# Patient Record
Sex: MALE | Race: WHITE | Employment: FULL TIME | URBAN - NONMETROPOLITAN AREA
[De-identification: names, ages, dates, MRNs, and addresses within clinical notes are randomized per-mention and may not be internally consistent; named-entity substitution may affect disease eponyms.]

---

## 2018-12-18 ENCOUNTER — HOSPITAL ENCOUNTER (EMERGENCY)
Age: 38
Discharge: HOME | End: 2018-12-18
Attending: FAMILY MEDICINE

## 2018-12-18 VITALS
WEIGHT: 250 LBS | RESPIRATION RATE: 17 BRPM | DIASTOLIC BLOOD PRESSURE: 77 MMHG | HEIGHT: 70 IN | OXYGEN SATURATION: 100 % | HEART RATE: 85 BPM | SYSTOLIC BLOOD PRESSURE: 118 MMHG | BODY MASS INDEX: 35.79 KG/M2 | TEMPERATURE: 98 F

## 2018-12-18 DIAGNOSIS — R55 SYNCOPE, UNSPECIFIED SYNCOPE TYPE: Primary | ICD-10-CM

## 2018-12-18 DIAGNOSIS — F19.10 SUBSTANCE ABUSE (HCC): ICD-10-CM

## 2018-12-18 LAB
ABSOLUTE EOS #: 0.1 K/UL (ref 0–0.4)
ABSOLUTE IMMATURE GRANULOCYTE: NORMAL K/UL (ref 0–0.3)
ABSOLUTE LYMPH #: 2.6 K/UL (ref 1–4.8)
ABSOLUTE MONO #: 0.7 K/UL (ref 0–1)
ACETAMINOPHEN LEVEL: <5 UG/ML (ref 10–30)
ALBUMIN SERPL-MCNC: 4.8 G/DL (ref 3.5–5.2)
ALBUMIN/GLOBULIN RATIO: ABNORMAL (ref 1–2.5)
ALP BLD-CCNC: 81 U/L (ref 40–129)
ALT SERPL-CCNC: 21 U/L (ref 5–41)
AMPHETAMINE SCREEN URINE: POSITIVE
ANION GAP SERPL CALCULATED.3IONS-SCNC: 13 MMOL/L (ref 9–17)
AST SERPL-CCNC: 12 U/L
BARBITURATE SCREEN URINE: NEGATIVE
BASOPHILS # BLD: 0 % (ref 0–2)
BASOPHILS ABSOLUTE: 0 K/UL (ref 0–0.2)
BENZODIAZEPINE SCREEN, URINE: POSITIVE
BILIRUB SERPL-MCNC: 0.34 MG/DL (ref 0.3–1.2)
BILIRUBIN URINE: NEGATIVE
BUN BLDV-MCNC: 11 MG/DL (ref 6–20)
BUN/CREAT BLD: 10 (ref 9–20)
BUPRENORPHINE URINE: ABNORMAL
CALCIUM SERPL-MCNC: 9.5 MG/DL (ref 8.6–10.4)
CANNABINOID SCREEN URINE: NEGATIVE
CHLORIDE BLD-SCNC: 102 MMOL/L (ref 98–107)
CO2: 27 MMOL/L (ref 20–31)
COCAINE METABOLITE, URINE: POSITIVE
COLOR: YELLOW
COMMENT UA: NORMAL
CREAT SERPL-MCNC: 1.12 MG/DL (ref 0.7–1.2)
DIFFERENTIAL TYPE: YES
EKG ATRIAL RATE: 85 BPM
EKG P AXIS: 26 DEGREES
EKG P-R INTERVAL: 154 MS
EKG Q-T INTERVAL: 398 MS
EKG QRS DURATION: 78 MS
EKG QTC CALCULATION (BAZETT): 473 MS
EKG R AXIS: 72 DEGREES
EKG T AXIS: 34 DEGREES
EKG VENTRICULAR RATE: 85 BPM
EOSINOPHILS RELATIVE PERCENT: 2 % (ref 0–5)
GFR AFRICAN AMERICAN: >60 ML/MIN
GFR NON-AFRICAN AMERICAN: >60 ML/MIN
GFR SERPL CREATININE-BSD FRML MDRD: ABNORMAL ML/MIN/{1.73_M2}
GFR SERPL CREATININE-BSD FRML MDRD: ABNORMAL ML/MIN/{1.73_M2}
GLUCOSE BLD-MCNC: 95 MG/DL (ref 70–99)
GLUCOSE URINE: NEGATIVE
HCT VFR BLD CALC: 48.4 % (ref 41–53)
HEMOGLOBIN: 16.2 G/DL (ref 13.5–17.5)
IMMATURE GRANULOCYTES: NORMAL %
KETONES, URINE: NEGATIVE
LEUKOCYTE ESTERASE, URINE: NEGATIVE
LIPASE: 25 U/L (ref 13–60)
LYMPHOCYTES # BLD: 35 % (ref 13–44)
MAGNESIUM: 3 MG/DL (ref 1.6–2.6)
MCH RBC QN AUTO: 29.3 PG (ref 26–34)
MCHC RBC AUTO-ENTMCNC: 33.4 G/DL (ref 31–37)
MCV RBC AUTO: 87.7 FL (ref 80–100)
MDMA URINE: ABNORMAL
METHADONE SCREEN, URINE: NEGATIVE
METHAMPHETAMINE, URINE: POSITIVE
MONOCYTES # BLD: 9 % (ref 5–9)
NITRITE, URINE: NEGATIVE
NRBC AUTOMATED: NORMAL PER 100 WBC
OPIATES, URINE: NEGATIVE
OXYCODONE SCREEN URINE: NEGATIVE
PDW BLD-RTO: 12.5 % (ref 12.1–15.2)
PH UA: 6 (ref 5–8)
PHENCYCLIDINE, URINE: NEGATIVE
PLATELET # BLD: 361 K/UL (ref 140–450)
PLATELET ESTIMATE: NORMAL
PMV BLD AUTO: NORMAL FL (ref 6–12)
POTASSIUM SERPL-SCNC: 3.5 MMOL/L (ref 3.7–5.3)
PROPOXYPHENE, URINE: NEGATIVE
PROTEIN UA: NEGATIVE
RBC # BLD: 5.52 M/UL (ref 4.5–5.9)
RBC # BLD: NORMAL 10*6/UL
SEG NEUTROPHILS: 54 % (ref 39–75)
SEGMENTED NEUTROPHILS ABSOLUTE COUNT: 4.1 K/UL (ref 2.1–6.5)
SODIUM BLD-SCNC: 142 MMOL/L (ref 135–144)
SPECIFIC GRAVITY UA: 1.01 (ref 1–1.03)
TEST INFORMATION: ABNORMAL
TOTAL PROTEIN: 7.7 G/DL (ref 6.4–8.3)
TRICYCLIC ANTIDEPRESSANTS, UR: NEGATIVE
TROPONIN INTERP: NORMAL
TROPONIN T: <0.03 NG/ML
TROPONIN, HIGH SENSITIVITY: NORMAL NG/L (ref 0–22)
TURBIDITY: CLEAR
URINE HGB: NEGATIVE
UROBILINOGEN, URINE: NORMAL
WBC # BLD: 7.6 K/UL (ref 3.5–11)
WBC # BLD: NORMAL 10*3/UL

## 2018-12-18 PROCEDURE — 80053 COMPREHEN METABOLIC PANEL: CPT

## 2018-12-18 PROCEDURE — 83735 ASSAY OF MAGNESIUM: CPT

## 2018-12-18 PROCEDURE — 84484 ASSAY OF TROPONIN QUANT: CPT

## 2018-12-18 PROCEDURE — 80306 DRUG TEST PRSMV INSTRMNT: CPT

## 2018-12-18 PROCEDURE — 83690 ASSAY OF LIPASE: CPT

## 2018-12-18 PROCEDURE — 81003 URINALYSIS AUTO W/O SCOPE: CPT

## 2018-12-18 PROCEDURE — 93005 ELECTROCARDIOGRAM TRACING: CPT

## 2018-12-18 PROCEDURE — 6370000000 HC RX 637 (ALT 250 FOR IP): Performed by: FAMILY MEDICINE

## 2018-12-18 PROCEDURE — 99284 EMERGENCY DEPT VISIT MOD MDM: CPT

## 2018-12-18 PROCEDURE — 80307 DRUG TEST PRSMV CHEM ANLYZR: CPT

## 2018-12-18 PROCEDURE — 85025 COMPLETE CBC W/AUTO DIFF WBC: CPT

## 2018-12-18 RX ORDER — ONDANSETRON 2 MG/ML
4 INJECTION INTRAMUSCULAR; INTRAVENOUS EVERY 30 MIN PRN
Status: DISCONTINUED | OUTPATIENT
Start: 2018-12-18 | End: 2018-12-18 | Stop reason: HOSPADM

## 2018-12-18 RX ORDER — CLONIDINE HYDROCHLORIDE 0.1 MG/1
0.1 TABLET ORAL PRN
COMMUNITY

## 2018-12-18 RX ORDER — ALPRAZOLAM 1 MG/1
1 TABLET ORAL NIGHTLY PRN
COMMUNITY

## 2018-12-18 RX ORDER — IBUPROFEN 200 MG
200 TABLET ORAL EVERY 6 HOURS PRN
COMMUNITY

## 2018-12-18 RX ORDER — BUPROPION HYDROCHLORIDE 150 MG/1
150 TABLET ORAL EVERY MORNING
COMMUNITY

## 2018-12-18 RX ORDER — TRAZODONE HYDROCHLORIDE 100 MG/1
100 TABLET ORAL NIGHTLY PRN
COMMUNITY

## 2018-12-18 RX ORDER — GABAPENTIN 300 MG/1
300 CAPSULE ORAL PRN
COMMUNITY

## 2018-12-18 RX ORDER — CLONIDINE HYDROCHLORIDE 0.1 MG/1
0.1 TABLET ORAL ONCE
Status: COMPLETED | OUTPATIENT
Start: 2018-12-18 | End: 2018-12-18

## 2018-12-18 RX ADMIN — CLONIDINE HYDROCHLORIDE 0.1 MG: 0.1 TABLET ORAL at 19:11

## 2018-12-18 ASSESSMENT — PATIENT HEALTH QUESTIONNAIRE - PHQ9: SUM OF ALL RESPONSES TO PHQ QUESTIONS 1-9: 21

## 2018-12-19 NOTE — PROGRESS NOTES
2029  Call from [facility] of Jazzy Browning stating pt is not suicidal, had thoughts in the past, would like Clinician to schedule pt an appointment with [facility] in [place]. 2036  Call to [facility] at [phone], consulted with Freya Salomon who scheduled pt an appointment tomorrow at 10:30am, [facility]/[place]. Seth request Clinician fax pts facesheet to [phone].    2038  Call to [facility], consulted with Anya Medel RN, informed of scheduled appointment. 2041  Facesheet faxed to Freya Salomon.

## 2018-12-19 NOTE — ED NOTES
Pt is currently in between counselors. Pt has seen [facility] in [place] and [facility] in [place]. Pt states, \"I miss [facility]. \"  Pt does not currently have a counselor. Pt is willing to speak with someone and travel to [facility] for counseling appointment. Pt is alert and oriented and does not have any current thoughts to harm himself. Dr. Phyllis Garg notified.       Min Schneider, RN  12/18/18 Christine Leigh 57 Charla Peck RN  12/18/18 [id number]

## 2023-02-04 PROBLEM — K21.9 GASTROESOPHAGEAL REFLUX DISEASE WITHOUT ESOPHAGITIS: Status: RESOLVED | Noted: 2023-02-04 | Resolved: 2023-02-04

## 2023-02-04 PROBLEM — R53.83 FATIGUE: Status: ACTIVE | Noted: 2023-02-04

## 2023-02-04 PROBLEM — R00.0 TACHYCARDIA: Status: ACTIVE | Noted: 2023-02-04

## 2023-02-04 PROBLEM — F41.1 GENERALIZED ANXIETY DISORDER WITH PANIC ATTACKS: Status: ACTIVE | Noted: 2023-02-04

## 2023-02-04 PROBLEM — R59.0 MEDIASTINAL LYMPHADENOPATHY: Status: ACTIVE | Noted: 2023-02-04

## 2023-02-04 PROBLEM — I10 HTN (HYPERTENSION): Status: ACTIVE | Noted: 2023-02-04

## 2023-02-04 PROBLEM — E66.01 MORBID OBESITY WITH BODY MASS INDEX (BMI) OF 40.0 OR HIGHER (MULTI): Status: RESOLVED | Noted: 2023-02-04 | Resolved: 2023-02-04

## 2023-02-04 PROBLEM — R06.02 SHORTNESS OF BREATH ON EXERTION: Status: ACTIVE | Noted: 2023-02-04

## 2023-02-04 PROBLEM — G47.33 OBSTRUCTIVE SLEEP APNEA, ADULT: Status: ACTIVE | Noted: 2023-02-04

## 2023-02-04 PROBLEM — R91.8 ABNORMAL CT SCAN, LUNG: Status: RESOLVED | Noted: 2023-02-04 | Resolved: 2023-02-04

## 2023-02-04 PROBLEM — K21.9 GASTROESOPHAGEAL REFLUX DISEASE WITHOUT ESOPHAGITIS: Status: ACTIVE | Noted: 2023-02-04

## 2023-02-04 PROBLEM — R91.1 LUNG NODULE, SOLITARY: Status: ACTIVE | Noted: 2023-02-04

## 2023-02-04 PROBLEM — K21.9 GERD (GASTROESOPHAGEAL REFLUX DISEASE): Status: ACTIVE | Noted: 2023-02-04

## 2023-02-04 PROBLEM — E66.01 MORBID OBESITY WITH BODY MASS INDEX (BMI) OF 40.0 OR HIGHER (MULTI): Status: ACTIVE | Noted: 2023-02-04

## 2023-02-04 PROBLEM — R53.83 FATIGUE: Status: RESOLVED | Noted: 2023-02-04 | Resolved: 2023-02-04

## 2023-02-04 PROBLEM — F41.0 GENERALIZED ANXIETY DISORDER WITH PANIC ATTACKS: Status: ACTIVE | Noted: 2023-02-04

## 2023-02-04 PROBLEM — L52 EN (ERYTHEMA NODOSUM): Status: ACTIVE | Noted: 2023-02-04

## 2023-02-04 PROBLEM — R91.8 LUNG NODULES: Status: RESOLVED | Noted: 2023-02-04 | Resolved: 2023-02-04

## 2023-02-04 PROBLEM — J44.9 OBSTRUCTIVE LUNG DISEASE (MULTI): Status: ACTIVE | Noted: 2023-02-04

## 2023-02-04 PROBLEM — R59.0 MEDIASTINAL LYMPHADENOPATHY: Status: RESOLVED | Noted: 2023-02-04 | Resolved: 2023-02-04

## 2023-02-04 PROBLEM — R91.8 LUNG NODULES: Status: ACTIVE | Noted: 2023-02-04

## 2023-02-04 PROBLEM — G43.909 MIGRAINES: Status: ACTIVE | Noted: 2023-02-04

## 2023-02-04 PROBLEM — E11.9 TYPE 2 DIABETES MELLITUS WITHOUT COMPLICATION, WITHOUT LONG-TERM CURRENT USE OF INSULIN (MULTI): Status: ACTIVE | Noted: 2023-02-04

## 2023-02-04 PROBLEM — F11.11 HISTORY OF OPIOID ABUSE (MULTI): Status: ACTIVE | Noted: 2023-02-04

## 2023-02-04 PROBLEM — R91.8 ABNORMAL CT SCAN, LUNG: Status: ACTIVE | Noted: 2023-02-04

## 2023-02-04 PROBLEM — R91.1 LUNG NODULE, SOLITARY: Status: RESOLVED | Noted: 2023-02-04 | Resolved: 2023-02-04

## 2023-02-04 RX ORDER — LISINOPRIL 10 MG/1
1 TABLET ORAL DAILY
COMMUNITY
Start: 2021-07-29 | End: 2023-09-18

## 2023-02-04 RX ORDER — ALBUTEROL SULFATE 90 UG/1
2 AEROSOL, METERED RESPIRATORY (INHALATION) 4 TIMES DAILY PRN
COMMUNITY
Start: 2021-12-07 | End: 2023-07-31 | Stop reason: SDUPTHER

## 2023-02-04 RX ORDER — METFORMIN HYDROCHLORIDE 500 MG/1
1 TABLET ORAL 2 TIMES DAILY
COMMUNITY
Start: 2022-10-11 | End: 2023-04-18

## 2023-02-04 RX ORDER — OMEPRAZOLE 40 MG/1
1 CAPSULE, DELAYED RELEASE ORAL DAILY
COMMUNITY
Start: 2022-09-13 | End: 2023-09-18 | Stop reason: SDUPTHER

## 2023-02-04 RX ORDER — ESCITALOPRAM OXALATE 20 MG/1
1 TABLET ORAL DAILY
COMMUNITY
Start: 2021-08-31 | End: 2023-09-18 | Stop reason: SDUPTHER

## 2023-02-04 RX ORDER — LORATADINE 10 MG/1
1 CAPSULE, LIQUID FILLED ORAL DAILY
COMMUNITY

## 2023-02-04 RX ORDER — SEMAGLUTIDE 1.34 MG/ML
1 INJECTION, SOLUTION SUBCUTANEOUS
COMMUNITY
End: 2023-06-22

## 2023-03-23 ENCOUNTER — OFFICE VISIT (OUTPATIENT)
Dept: PRIMARY CARE | Facility: CLINIC | Age: 43
End: 2023-03-23
Payer: COMMERCIAL

## 2023-03-23 VITALS
HEIGHT: 70 IN | HEART RATE: 91 BPM | BODY MASS INDEX: 45.1 KG/M2 | OXYGEN SATURATION: 98 % | SYSTOLIC BLOOD PRESSURE: 126 MMHG | DIASTOLIC BLOOD PRESSURE: 80 MMHG | WEIGHT: 315 LBS

## 2023-03-23 DIAGNOSIS — E11.9 TYPE 2 DIABETES MELLITUS WITHOUT COMPLICATION, WITHOUT LONG-TERM CURRENT USE OF INSULIN (MULTI): Primary | ICD-10-CM

## 2023-03-23 DIAGNOSIS — E66.01 CLASS 3 SEVERE OBESITY DUE TO EXCESS CALORIES WITH SERIOUS COMORBIDITY AND BODY MASS INDEX (BMI) OF 45.0 TO 49.9 IN ADULT (MULTI): ICD-10-CM

## 2023-03-23 DIAGNOSIS — I10 PRIMARY HYPERTENSION: ICD-10-CM

## 2023-03-23 PROBLEM — R00.0 TACHYCARDIA: Status: RESOLVED | Noted: 2023-02-04 | Resolved: 2023-03-23

## 2023-03-23 PROBLEM — E66.813 CLASS 3 SEVERE OBESITY DUE TO EXCESS CALORIES WITH SERIOUS COMORBIDITY AND BODY MASS INDEX (BMI) OF 45.0 TO 49.9 IN ADULT: Status: ACTIVE | Noted: 2023-02-04

## 2023-03-23 PROBLEM — R06.02 SHORTNESS OF BREATH ON EXERTION: Status: RESOLVED | Noted: 2023-02-04 | Resolved: 2023-03-23

## 2023-03-23 PROCEDURE — 3008F BODY MASS INDEX DOCD: CPT | Performed by: INTERNAL MEDICINE

## 2023-03-23 PROCEDURE — 3074F SYST BP LT 130 MM HG: CPT | Performed by: INTERNAL MEDICINE

## 2023-03-23 PROCEDURE — 99213 OFFICE O/P EST LOW 20 MIN: CPT | Performed by: INTERNAL MEDICINE

## 2023-03-23 PROCEDURE — 3044F HG A1C LEVEL LT 7.0%: CPT | Performed by: INTERNAL MEDICINE

## 2023-03-23 PROCEDURE — 3079F DIAST BP 80-89 MM HG: CPT | Performed by: INTERNAL MEDICINE

## 2023-03-23 PROCEDURE — 4010F ACE/ARB THERAPY RXD/TAKEN: CPT | Performed by: INTERNAL MEDICINE

## 2023-03-23 ASSESSMENT — ENCOUNTER SYMPTOMS
FATIGUE: 1
WHEEZING: 0
DIARRHEA: 0
BLOOD IN STOOL: 0
CHEST TIGHTNESS: 0
COUGH: 0
ABDOMINAL PAIN: 0
SHORTNESS OF BREATH: 0
ARTHRALGIAS: 0
NAUSEA: 1
BACK PAIN: 0
PALPITATIONS: 0
VOMITING: 0

## 2023-03-23 NOTE — ASSESSMENT & PLAN NOTE
We added Ozempic for his diabetes and he has responded well in the way of weight loss.  We estimate he is lost approximately 14 pounds since his last weigh-in and he will continue to take the Ozempic as directed which is 1 mg weekly  He will call with any problems and otherwise we want to see him back in 3 months

## 2023-03-23 NOTE — ASSESSMENT & PLAN NOTE
He has been doing well in the way of his diabetes and his last hemoglobin A1c was 6.2 on January 30.  We will check another one just prior to next visit

## 2023-03-23 NOTE — PROGRESS NOTES
Subjective   Patient ID: Sebastian Carr is a 43 y.o. male who presents for No chief complaint on file..  HPI  He is here today for follow-up on his diabetes and also his weight.  He states it took some time to get the Ozempic through his plan and he has been on it for approximately 5 weeks.  He states in the beginning he was having significant nausea and some bloating but he states that over time it is improving and he feels like he is tolerating it relatively well.  We estimate that according to our scales he is lost already approximately 14 pounds.  His blood sugars have also been very good.  We decided to continue with the medication but I would like to see complete resolution of the symptoms and he will call if it is not continuing to improve and certainly if is getting worse.  We did conduct a full review of systems and we discussed future follow-up.  Overall he reports doing well with his breathing and his blood pressure is under great control.  We will see him back in 3 months for follow-up and he knows to get lab work just prior to that visit.  Review of Systems   Constitutional:  Positive for fatigue.   Respiratory:  Negative for cough, chest tightness, shortness of breath and wheezing.    Cardiovascular:  Negative for chest pain, palpitations and leg swelling.   Gastrointestinal:  Positive for nausea. Negative for abdominal pain, blood in stool, diarrhea and vomiting.   Musculoskeletal:  Negative for arthralgias and back pain.     Objective   Physical Exam  Vitals and nursing note reviewed.   Constitutional:       General: He is not in acute distress.     Appearance: Normal appearance.   HENT:      Head: Normocephalic and atraumatic.   Eyes:      Conjunctiva/sclera: Conjunctivae normal.   Cardiovascular:      Rate and Rhythm: Normal rate and regular rhythm.      Heart sounds: Normal heart sounds.   Pulmonary:      Effort: No respiratory distress.      Breath sounds: No wheezing.   Abdominal:      Palpations:  Abdomen is soft.      Tenderness: There is no abdominal tenderness. There is no guarding.   Musculoskeletal:         General: No swelling. Normal range of motion.   Skin:     General: Skin is warm and dry.   Neurological:      General: No focal deficit present.      Mental Status: He is alert and oriented to person, place, and time.   Psychiatric:         Behavior: Behavior normal.       Assessment/Plan   Problem List Items Addressed This Visit          Circulatory    HTN (hypertension)     Blood pressure is currently well controlled  He will continue with lisinopril 10 mg daily         Relevant Orders    Follow Up In Primary Care    Basic Metabolic Panel       Endocrine/Metabolic    Type 2 diabetes mellitus without complication, without long-term current use of insulin (CMS/[facility]) - Primary     He has been doing well in the way of his diabetes and his last hemoglobin A1c was 6.2 on January 30.  We will check another one just prior to next visit         Relevant Orders    Follow Up In Primary Care    Hemoglobin A1c    Class 3 severe obesity due to excess calories with serious comorbidity and body mass index (BMI) of 45.0 to 49.9 in adult (CMS/[facility])     We added Ozempic for his diabetes and he has responded well in the way of weight loss.  We estimate he is lost approximately 14 pounds since his last weigh-in and he will continue to take the Ozempic as directed which is 1 mg weekly  He will call with any problems and otherwise we want to see him back in 3 months         Relevant Orders    Follow Up In Primary Care          Marcelina Chavez DO

## 2023-04-18 DIAGNOSIS — E11.9 TYPE 2 DIABETES MELLITUS WITHOUT COMPLICATION, WITHOUT LONG-TERM CURRENT USE OF INSULIN (MULTI): Primary | ICD-10-CM

## 2023-04-18 RX ORDER — METFORMIN HYDROCHLORIDE 500 MG/1
TABLET ORAL
Qty: 360 TABLET | Refills: 1 | Status: SHIPPED | OUTPATIENT
Start: 2023-04-18 | End: 2023-12-21

## 2023-04-24 ENCOUNTER — TELEPHONE (OUTPATIENT)
Dept: PRIMARY CARE | Facility: CLINIC | Age: 43
End: 2023-04-24

## 2023-04-24 ENCOUNTER — TELEMEDICINE (OUTPATIENT)
Dept: PRIMARY CARE | Facility: CLINIC | Age: 43
End: 2023-04-24
Payer: COMMERCIAL

## 2023-04-24 DIAGNOSIS — U07.1 COVID-19 VIRUS INFECTION: Primary | ICD-10-CM

## 2023-04-24 DIAGNOSIS — J44.9 OBSTRUCTIVE LUNG DISEASE (MULTI): ICD-10-CM

## 2023-04-24 PROCEDURE — 99213 OFFICE O/P EST LOW 20 MIN: CPT | Performed by: INTERNAL MEDICINE

## 2023-04-24 RX ORDER — FLUTICASONE PROPIONATE 50 MCG
2 SPRAY, SUSPENSION (ML) NASAL EVERY 12 HOURS
COMMUNITY

## 2023-04-24 ASSESSMENT — ENCOUNTER SYMPTOMS
NAUSEA: 1
DIARRHEA: 1
SORE THROAT: 1
HEADACHES: 1
FEVER: 1

## 2023-04-24 NOTE — PROGRESS NOTES
Subjective   Patient ID: Sebastian Carr is a 43 y.o. male who presents for No chief complaint on file..   Fever   This is a new problem. The current episode started yesterday. The problem occurs constantly. The problem has been unchanged. The maximum temperature noted was 100 to 100.9 F. The temperature was taken using an oral thermometer. Associated symptoms include congestion, diarrhea, headaches, muscle aches, nausea, sleepiness and a sore throat.   Based on his multiple comorbidities I am going to put him on Paxlovid for treatment.  He will try to symptomatic medications and call if he is not improving.  We also talked about the importance of quitting smoking.  He currently declines a referral to smoking cessation program but will let me know if he is not successful in trying on his own.  He is aware not to tell me that he had multiple family members that have been tested genetically for hemochromatosis and they are all carriers.  He states he will check with his insurance company to see if he is allowed to get a DNA test and let me know.  Review of Systems   Constitutional:  Positive for fever.   HENT:  Positive for congestion and sore throat.    Gastrointestinal:  Positive for diarrhea and nausea.   Neurological:  Positive for headaches.     Objective   Physical Exam  HENT:      Head: Atraumatic.   Pulmonary:      Effort: Pulmonary effort is normal.   Neurological:      Mental Status: He is alert.   Psychiatric:         Mood and Affect: Mood normal.       Assessment/Plan   Problem List Items Addressed This Visit          Respiratory    Obstructive lung disease (CMS/HCC)       Infectious/Inflammatory    COVID-19 virus infection - Primary     -Based on his multiple comorbidities he is at high risk of complications from COVID-19 infection and therefore I am sending a prescription in for Paxlovid.  I asked that he get a started tonight.  His symptoms began on late Friday and he will do the 5-day quarantine.  We  talked about going back to work on Wednesday.  He will take symptomatic medications and call if he is not doing better               Marcelina Chavez, DO

## 2023-04-24 NOTE — ASSESSMENT & PLAN NOTE
-Based on his multiple comorbidities he is at high risk of complications from COVID-19 infection and therefore I am sending a prescription in for Paxlovid.  I asked that he get a started tonight.  His symptoms began on late Friday and he will do the 5-day quarantine.  We talked about going back to work on Wednesday.  He will take symptomatic medications and call if he is not doing better

## 2023-06-19 ENCOUNTER — LAB (OUTPATIENT)
Dept: LAB | Facility: LAB | Age: 43
End: 2023-06-19
Payer: COMMERCIAL

## 2023-06-19 DIAGNOSIS — E11.9 TYPE 2 DIABETES MELLITUS WITHOUT COMPLICATION, WITHOUT LONG-TERM CURRENT USE OF INSULIN (MULTI): ICD-10-CM

## 2023-06-19 DIAGNOSIS — I10 PRIMARY HYPERTENSION: ICD-10-CM

## 2023-06-19 LAB
ANION GAP IN SER/PLAS: 10 MMOL/L (ref 10–20)
CALCIUM (MG/DL) IN SER/PLAS: 9.2 MG/DL (ref 8.6–10.3)
CARBON DIOXIDE, TOTAL (MMOL/L) IN SER/PLAS: 30 MMOL/L (ref 21–32)
CHLORIDE (MMOL/L) IN SER/PLAS: 101 MMOL/L (ref 98–107)
CREATININE (MG/DL) IN SER/PLAS: 0.92 MG/DL (ref 0.5–1.3)
ESTIMATED AVERAGE GLUCOSE FOR HBA1C: 117 MG/DL
GFR MALE: >90 ML/MIN/1.73M2
GLUCOSE (MG/DL) IN SER/PLAS: 97 MG/DL (ref 74–99)
HEMOGLOBIN A1C/HEMOGLOBIN TOTAL IN BLOOD: 5.7 %
POTASSIUM (MMOL/L) IN SER/PLAS: 4.4 MMOL/L (ref 3.5–5.3)
SODIUM (MMOL/L) IN SER/PLAS: 137 MMOL/L (ref 136–145)
UREA NITROGEN (MG/DL) IN SER/PLAS: 17 MG/DL (ref 6–23)

## 2023-06-19 PROCEDURE — 80048 BASIC METABOLIC PNL TOTAL CA: CPT

## 2023-06-19 PROCEDURE — 83036 HEMOGLOBIN GLYCOSYLATED A1C: CPT

## 2023-06-19 PROCEDURE — 36415 COLL VENOUS BLD VENIPUNCTURE: CPT

## 2023-06-22 ENCOUNTER — OFFICE VISIT (OUTPATIENT)
Dept: PRIMARY CARE | Facility: CLINIC | Age: 43
End: 2023-06-22
Payer: COMMERCIAL

## 2023-06-22 VITALS
SYSTOLIC BLOOD PRESSURE: 120 MMHG | OXYGEN SATURATION: 100 % | DIASTOLIC BLOOD PRESSURE: 80 MMHG | WEIGHT: 315 LBS | BODY MASS INDEX: 45.1 KG/M2 | HEIGHT: 70 IN | HEART RATE: 66 BPM

## 2023-06-22 DIAGNOSIS — E11.9 TYPE 2 DIABETES MELLITUS WITHOUT COMPLICATION, WITHOUT LONG-TERM CURRENT USE OF INSULIN (MULTI): ICD-10-CM

## 2023-06-22 DIAGNOSIS — I10 PRIMARY HYPERTENSION: ICD-10-CM

## 2023-06-22 DIAGNOSIS — F41.0 GENERALIZED ANXIETY DISORDER WITH PANIC ATTACKS: ICD-10-CM

## 2023-06-22 DIAGNOSIS — E78.2 MIXED HYPERLIPIDEMIA: ICD-10-CM

## 2023-06-22 DIAGNOSIS — F41.1 GENERALIZED ANXIETY DISORDER WITH PANIC ATTACKS: ICD-10-CM

## 2023-06-22 DIAGNOSIS — G47.33 OBSTRUCTIVE SLEEP APNEA, ADULT: Primary | ICD-10-CM

## 2023-06-22 DIAGNOSIS — R25.1 TREMOR: ICD-10-CM

## 2023-06-22 DIAGNOSIS — E66.01 CLASS 3 SEVERE OBESITY DUE TO EXCESS CALORIES WITH SERIOUS COMORBIDITY AND BODY MASS INDEX (BMI) OF 45.0 TO 49.9 IN ADULT (MULTI): ICD-10-CM

## 2023-06-22 PROBLEM — U07.1 COVID-19 VIRUS INFECTION: Status: RESOLVED | Noted: 2023-04-24 | Resolved: 2023-06-22

## 2023-06-22 PROCEDURE — 4010F ACE/ARB THERAPY RXD/TAKEN: CPT | Performed by: INTERNAL MEDICINE

## 2023-06-22 PROCEDURE — 3079F DIAST BP 80-89 MM HG: CPT | Performed by: INTERNAL MEDICINE

## 2023-06-22 PROCEDURE — 3074F SYST BP LT 130 MM HG: CPT | Performed by: INTERNAL MEDICINE

## 2023-06-22 PROCEDURE — 99214 OFFICE O/P EST MOD 30 MIN: CPT | Performed by: INTERNAL MEDICINE

## 2023-06-22 PROCEDURE — 3008F BODY MASS INDEX DOCD: CPT | Performed by: INTERNAL MEDICINE

## 2023-06-22 PROCEDURE — 3044F HG A1C LEVEL LT 7.0%: CPT | Performed by: INTERNAL MEDICINE

## 2023-06-22 ASSESSMENT — ENCOUNTER SYMPTOMS
VISUAL CHANGE: 0
CONFUSION: 0
POLYDIPSIA: 0
BLURRED VISION: 1
HUNGER: 1
TREMORS: 1
SWEATS: 1
WEAKNESS: 0
DIZZINESS: 0
BLACKOUTS: 0
FATIGUE: 1
SEIZURES: 0
NERVOUS/ANXIOUS: 1
SPEECH DIFFICULTY: 0
POLYPHAGIA: 1
HEADACHES: 0
WEIGHT LOSS: 0

## 2023-06-22 ASSESSMENT — PATIENT HEALTH QUESTIONNAIRE - PHQ9
2. FEELING DOWN, DEPRESSED OR HOPELESS: NOT AT ALL
SUM OF ALL RESPONSES TO PHQ9 QUESTIONS 1 AND 2: 0
1. LITTLE INTEREST OR PLEASURE IN DOING THINGS: NOT AT ALL

## 2023-06-22 NOTE — ASSESSMENT & PLAN NOTE
-He is going to continue working towards gradual weight loss through lifestyle practice changes and we did discuss a possible referral to the weight loss clinic through [facility].  He knows he can call at any time if he would like to be referred

## 2023-06-22 NOTE — PATIENT INSTRUCTIONS
Please do not hesitate to call if you change your mind about referral to weight loss clinic  I am very pleased to hear that you are down to just a few cigarettes at a time but please try to walk away completely because unfortunately if you increase your intake of tobacco it is much more difficult to quit  Please reduce the caffeine intake and if you are tremor does not resolve please let us know  We plan on seeing you back in 6 months and just prior to that visit please remember to get fasting lab work

## 2023-06-22 NOTE — ASSESSMENT & PLAN NOTE
-Hemoglobin A1c is excellent at 5.7  -He will continue with metformin 500 mg twice daily  -He is off Ozempic as of 2 weeks ago due to lack of insurance coverage for this medication  -He is reminded to at least get a yearly eye examination, to do daily foot checks, and do not go barefoot  -We have decided that we can safely see him back in 6 months with repeat laboratory testing

## 2023-06-22 NOTE — PROGRESS NOTES
Subjective   Patient ID: Sebastian Carr is a 43 y.o. male who presents for No chief complaint on file..  Diabetes  He has type 2 diabetes mellitus. No MedicAlert identification noted. The initial diagnosis of diabetes was made 6 months ago. Hypoglycemia symptoms include hunger, nervousness/anxiousness, sweats and tremors. Pertinent negatives for hypoglycemia include no confusion, dizziness, headaches, mood changes, pallor, seizures, sleepiness or speech difficulty. Associated symptoms include blurred vision, fatigue and polyphagia. Pertinent negatives for diabetes include no chest pain, no foot paresthesias, no foot ulcerations, no polydipsia, no polyuria, no visual change, no weakness and no weight loss. Pertinent negatives for hypoglycemia complications include no blackouts, no hospitalization, no nocturnal hypoglycemia, no required assistance and no required glucagon injection. Symptoms are stable. Diabetic complications include impotence. Pertinent negatives for diabetic complications include no CVA, heart disease, nephropathy, peripheral neuropathy, PVD or retinopathy. Risk factors for coronary artery disease include family history, hypertension, obesity, sedentary lifestyle, stress and tobacco exposure. Current diabetic treatment includes diet and oral agent (monotherapy). He is compliant with treatment most of the time. His weight is increasing steadily. He is following a high fiber diet. Meal planning includes avoidance of concentrated sweets. He has not had a previous visit with a dietitian. He never participates in exercise. He monitors blood glucose at home 1-2 x per week. He monitors urine at home <1 x per month. Blood glucose monitoring compliance is poor. He does not see a podiatrist.Eye exam is not current.   Summary above created by patient  He is here today for his routine checkup and we discussed his progress with weight loss.  We are reminded that he was prescribed Ozempic and really having great  success with weight loss.  Unfortunately his insurance company is refusing to cover it as of 2 weeks ago and he has been off of it since that time.  He states has been trying to focus on healthier lifestyle practices and so far he continues to lose weight.  We talked about alternative treatments such as referral to the weight loss center through [facility] and of course seeing a dietitian might be of benefit.  We will continue to monitor.  He also states that just yesterday his girlfriend noticed that he had a slight tremor in his hand.  He states he has never been aware of it before.  He states he has increased his caffeine intake and we discussed how caffeine can definitely cause a tremor.  We talked about gradually weaning it and if the tremor remains he will get back with us.  We did conduct a full review of systems and we also went over the results of recent lab work.  Overall I am pleased with his numbers and his hemoglobin A1c was very good at 5.7..  I will summarize everything in a problem based form  Review of Systems   Constitutional:  Positive for fatigue. Negative for weight loss.   Eyes:  Positive for blurred vision.   Cardiovascular:  Negative for chest pain.   Endocrine: Positive for polyphagia. Negative for polydipsia and polyuria.   Genitourinary:  Positive for impotence.   Skin:  Negative for pallor.   Neurological:  Positive for tremors. Negative for dizziness, seizures, speech difficulty, weakness and headaches.   Psychiatric/Behavioral:  Negative for confusion. The patient is nervous/anxious.      Objective   Physical Exam  Vitals and nursing note reviewed.   Constitutional:       General: He is not in acute distress.     Appearance: Normal appearance.   HENT:      Head: Normocephalic and atraumatic.   Eyes:      Conjunctiva/sclera: Conjunctivae normal.   Cardiovascular:      Rate and Rhythm: Normal rate and regular rhythm.      Heart sounds: Normal heart sounds.   Pulmonary:       Effort: No respiratory distress.      Breath sounds: No wheezing.   Abdominal:      Palpations: Abdomen is soft.      Tenderness: There is no abdominal tenderness. There is no guarding.   Musculoskeletal:         General: No swelling. Normal range of motion.   Skin:     General: Skin is warm and dry.   Neurological:      General: No focal deficit present.      Mental Status: He is alert and oriented to person, place, and time.   Psychiatric:         Behavior: Behavior normal.       Recent Results (from the past 672 hour(s))   Basic Metabolic Panel    Collection Time: 06/19/23  7:34 AM   Result Value Ref Range    Glucose 97 74 - 99 mg/dL    Sodium 137 136 - 145 mmol/L    Potassium 4.4 3.5 - 5.3 mmol/L    Chloride 101 98 - 107 mmol/L    Bicarbonate 30 21 - 32 mmol/L    Anion Gap 10 10 - 20 mmol/L    Urea Nitrogen 17 6 - 23 mg/dL    Creatinine 0.92 0.50 - 1.30 mg/dL    GFR MALE >90 >90 mL/min/1.73m2    Calcium 9.2 8.6 - 10.3 mg/dL   Hemoglobin A1c    Collection Time: 06/19/23  7:34 AM   Result Value Ref Range    Hemoglobin A1C 5.7 (A) %    Estimated Average Glucose 117 MG/DL       Assessment/Plan   Problem List Items Addressed This Visit       Generalized anxiety disorder with panic attacks     -Doing well on escitalopram 20 mg daily         Type 2 diabetes mellitus without complication, without long-term current use of insulin (CMS/[facility])     -Hemoglobin A1c is excellent at 5.7  -He will continue with metformin 500 mg twice daily  -He is off Ozempic as of 2 weeks ago due to lack of insurance coverage for this medication  -He is reminded to at least get a yearly eye examination, to do daily foot checks, and do not go barefoot  -We have decided that we can safely see him back in 6 months with repeat laboratory testing           Relevant Orders    Hemoglobin A1C    Basic Metabolic Panel    Obstructive sleep apnea, adult - Primary     -Continues to wear his CPAP device faithfully and has derived benefit from its use          Class 3 severe obesity due to excess calories with serious comorbidity and body mass index (BMI) of 45.0 to 49.9 in adult (CMS/[facility])     -He is going to continue working towards gradual weight loss through lifestyle practice changes and we did discuss a possible referral to the weight loss clinic through [facility].  He knows he can call at any time if he would like to be referred         HTN (hypertension)     -Blood pressure is currently very well controlled  -He will continue with lisinopril 10 mg daily         Tremor     -He will gradually reduce his caffeine consumption and if tremor persist he will get back with us          Other Visit Diagnoses       Mixed hyperlipidemia        Relevant Orders    Lipid Panel               Marcelina Chavez, DO

## 2023-07-31 DIAGNOSIS — J44.9 OBSTRUCTIVE LUNG DISEASE (MULTI): ICD-10-CM

## 2023-07-31 RX ORDER — ALBUTEROL SULFATE 90 UG/1
2 AEROSOL, METERED RESPIRATORY (INHALATION) 4 TIMES DAILY PRN
Qty: 18 G | Refills: 3 | Status: SHIPPED | OUTPATIENT
Start: 2023-07-31 | End: 2023-12-14

## 2023-09-14 DIAGNOSIS — F41.1 GENERALIZED ANXIETY DISORDER WITH PANIC ATTACKS: ICD-10-CM

## 2023-09-14 DIAGNOSIS — F41.0 GENERALIZED ANXIETY DISORDER WITH PANIC ATTACKS: ICD-10-CM

## 2023-09-14 DIAGNOSIS — K21.9 GASTROESOPHAGEAL REFLUX DISEASE, UNSPECIFIED WHETHER ESOPHAGITIS PRESENT: ICD-10-CM

## 2023-09-14 DIAGNOSIS — I10 PRIMARY HYPERTENSION: ICD-10-CM

## 2023-09-18 RX ORDER — LISINOPRIL 10 MG/1
10 TABLET ORAL DAILY
Qty: 90 TABLET | Refills: 1 | Status: SHIPPED | OUTPATIENT
Start: 2023-09-18 | End: 2023-12-21 | Stop reason: SDUPTHER

## 2023-09-18 RX ORDER — ESCITALOPRAM OXALATE 20 MG/1
20 TABLET ORAL DAILY
Qty: 90 TABLET | Refills: 1 | Status: SHIPPED | OUTPATIENT
Start: 2023-09-18 | End: 2024-03-11

## 2023-09-18 RX ORDER — OMEPRAZOLE 40 MG/1
40 CAPSULE, DELAYED RELEASE ORAL
Qty: 90 CAPSULE | Refills: 1 | Status: SHIPPED | OUTPATIENT
Start: 2023-09-18 | End: 2023-12-21 | Stop reason: SDUPTHER

## 2023-12-14 DIAGNOSIS — J44.9 OBSTRUCTIVE LUNG DISEASE (MULTI): ICD-10-CM

## 2023-12-14 RX ORDER — ALBUTEROL SULFATE 90 UG/1
AEROSOL, METERED RESPIRATORY (INHALATION)
Qty: 8.5 G | Refills: 3 | Status: SHIPPED | OUTPATIENT
Start: 2023-12-14 | End: 2023-12-21 | Stop reason: SDUPTHER

## 2023-12-20 ENCOUNTER — LAB (OUTPATIENT)
Dept: LAB | Facility: LAB | Age: 43
End: 2023-12-20
Payer: COMMERCIAL

## 2023-12-20 DIAGNOSIS — E78.2 MIXED HYPERLIPIDEMIA: ICD-10-CM

## 2023-12-20 DIAGNOSIS — E11.9 TYPE 2 DIABETES MELLITUS WITHOUT COMPLICATION, WITHOUT LONG-TERM CURRENT USE OF INSULIN (MULTI): ICD-10-CM

## 2023-12-20 LAB
ANION GAP SERPL CALC-SCNC: 12 MMOL/L (ref 10–20)
BUN SERPL-MCNC: 19 MG/DL (ref 6–23)
CALCIUM SERPL-MCNC: 9.1 MG/DL (ref 8.6–10.3)
CHLORIDE SERPL-SCNC: 101 MMOL/L (ref 98–107)
CHOLEST SERPL-MCNC: 198 MG/DL (ref 0–199)
CHOLESTEROL/HDL RATIO: 4.4
CO2 SERPL-SCNC: 28 MMOL/L (ref 21–32)
CREAT SERPL-MCNC: 0.89 MG/DL (ref 0.5–1.3)
EST. AVERAGE GLUCOSE BLD GHB EST-MCNC: 148 MG/DL
GFR SERPL CREATININE-BSD FRML MDRD: >90 ML/MIN/1.73M*2
GLUCOSE SERPL-MCNC: 99 MG/DL (ref 74–99)
HBA1C MFR BLD: 6.8 %
HDLC SERPL-MCNC: 45 MG/DL
LDLC SERPL CALC-MCNC: 102 MG/DL
NON HDL CHOLESTEROL: 153 MG/DL (ref 0–149)
POTASSIUM SERPL-SCNC: 4.3 MMOL/L (ref 3.5–5.3)
SODIUM SERPL-SCNC: 137 MMOL/L (ref 136–145)
TRIGL SERPL-MCNC: 257 MG/DL (ref 0–149)
VLDL: 51 MG/DL (ref 0–40)

## 2023-12-20 PROCEDURE — 36415 COLL VENOUS BLD VENIPUNCTURE: CPT

## 2023-12-20 PROCEDURE — 83036 HEMOGLOBIN GLYCOSYLATED A1C: CPT

## 2023-12-20 PROCEDURE — 80048 BASIC METABOLIC PNL TOTAL CA: CPT

## 2023-12-20 PROCEDURE — 80061 LIPID PANEL: CPT

## 2023-12-21 ENCOUNTER — OFFICE VISIT (OUTPATIENT)
Dept: PRIMARY CARE | Facility: CLINIC | Age: 43
End: 2023-12-21
Payer: COMMERCIAL

## 2023-12-21 VITALS
WEIGHT: 315 LBS | SYSTOLIC BLOOD PRESSURE: 138 MMHG | DIASTOLIC BLOOD PRESSURE: 80 MMHG | OXYGEN SATURATION: 98 % | HEART RATE: 95 BPM | BODY MASS INDEX: 51.73 KG/M2

## 2023-12-21 DIAGNOSIS — I10 PRIMARY HYPERTENSION: ICD-10-CM

## 2023-12-21 DIAGNOSIS — J44.9 OBSTRUCTIVE LUNG DISEASE (MULTI): ICD-10-CM

## 2023-12-21 DIAGNOSIS — E11.9 TYPE 2 DIABETES MELLITUS WITHOUT COMPLICATION, WITHOUT LONG-TERM CURRENT USE OF INSULIN (MULTI): ICD-10-CM

## 2023-12-21 DIAGNOSIS — E78.2 MIXED HYPERLIPIDEMIA: ICD-10-CM

## 2023-12-21 DIAGNOSIS — K21.9 GASTROESOPHAGEAL REFLUX DISEASE, UNSPECIFIED WHETHER ESOPHAGITIS PRESENT: ICD-10-CM

## 2023-12-21 DIAGNOSIS — F41.1 GENERALIZED ANXIETY DISORDER WITH PANIC ATTACKS: ICD-10-CM

## 2023-12-21 DIAGNOSIS — F41.0 GENERALIZED ANXIETY DISORDER WITH PANIC ATTACKS: ICD-10-CM

## 2023-12-21 PROBLEM — E66.813 CLASS 3 SEVERE OBESITY DUE TO EXCESS CALORIES WITH SERIOUS COMORBIDITY AND BODY MASS INDEX (BMI) OF 45.0 TO 49.9 IN ADULT: Status: RESOLVED | Noted: 2023-02-04 | Resolved: 2023-12-21

## 2023-12-21 PROBLEM — E66.01 CLASS 3 SEVERE OBESITY DUE TO EXCESS CALORIES WITH SERIOUS COMORBIDITY AND BODY MASS INDEX (BMI) OF 45.0 TO 49.9 IN ADULT (MULTI): Status: RESOLVED | Noted: 2023-02-04 | Resolved: 2023-12-21

## 2023-12-21 PROCEDURE — 4010F ACE/ARB THERAPY RXD/TAKEN: CPT | Performed by: INTERNAL MEDICINE

## 2023-12-21 PROCEDURE — 3049F LDL-C 100-129 MG/DL: CPT | Performed by: INTERNAL MEDICINE

## 2023-12-21 PROCEDURE — 3044F HG A1C LEVEL LT 7.0%: CPT | Performed by: INTERNAL MEDICINE

## 2023-12-21 PROCEDURE — 99214 OFFICE O/P EST MOD 30 MIN: CPT | Performed by: INTERNAL MEDICINE

## 2023-12-21 PROCEDURE — 3008F BODY MASS INDEX DOCD: CPT | Performed by: INTERNAL MEDICINE

## 2023-12-21 PROCEDURE — 3075F SYST BP GE 130 - 139MM HG: CPT | Performed by: INTERNAL MEDICINE

## 2023-12-21 PROCEDURE — 3079F DIAST BP 80-89 MM HG: CPT | Performed by: INTERNAL MEDICINE

## 2023-12-21 RX ORDER — ALBUTEROL SULFATE 90 UG/1
AEROSOL, METERED RESPIRATORY (INHALATION)
Qty: 8.5 G | Refills: 3 | Status: SHIPPED | OUTPATIENT
Start: 2023-12-21

## 2023-12-21 RX ORDER — OMEPRAZOLE 40 MG/1
40 CAPSULE, DELAYED RELEASE ORAL
Qty: 90 CAPSULE | Refills: 1 | Status: SHIPPED | OUTPATIENT
Start: 2023-12-21

## 2023-12-21 RX ORDER — METFORMIN HYDROCHLORIDE 500 MG/1
500 TABLET ORAL 2 TIMES DAILY
Qty: 360 TABLET | Refills: 1 | Status: CANCELLED | OUTPATIENT
Start: 2023-12-21

## 2023-12-21 RX ORDER — METFORMIN HYDROCHLORIDE 1000 MG/1
1000 TABLET ORAL
Qty: 180 TABLET | Refills: 1 | Status: SHIPPED | OUTPATIENT
Start: 2023-12-21 | End: 2024-12-20

## 2023-12-21 RX ORDER — LISINOPRIL 10 MG/1
10 TABLET ORAL DAILY
Qty: 90 TABLET | Refills: 1 | Status: SHIPPED | OUTPATIENT
Start: 2023-12-21

## 2023-12-21 ASSESSMENT — ENCOUNTER SYMPTOMS
BACK PAIN: 0
BLOOD IN STOOL: 0
VOMITING: 0
NAUSEA: 0
FATIGUE: 1
COUGH: 0
ARTHRALGIAS: 0
ABDOMINAL PAIN: 0
SHORTNESS OF BREATH: 0
WHEEZING: 0
PALPITATIONS: 0
DIARRHEA: 0

## 2023-12-21 ASSESSMENT — PATIENT HEALTH QUESTIONNAIRE - PHQ9
SUM OF ALL RESPONSES TO PHQ9 QUESTIONS 1 AND 2: 0
1. LITTLE INTEREST OR PLEASURE IN DOING THINGS: NOT AT ALL
2. FEELING DOWN, DEPRESSED OR HOPELESS: NOT AT ALL

## 2023-12-21 NOTE — PATIENT INSTRUCTIONS
As we discussed please check with your insurance company regarding coverage all weight loss medications i.e. injectable medicines that could help address both diabetes and weight loss  Please also understand that we would like to refer you to the weight loss clinic through  if you are interested  As we discussed I have increased the dose of your metformin from 500 mg twice daily up to 1000 mg twice daily.  You can use up your 500 mg tablets by taking 2 at the very same time  I recommend you try a probiotic called align.  You might discover that this helps with your colon health and overall symptoms.  Recommend trying it for a month and if you do not see significant difference then do not waste your money  We will see you back in approximately 6 months and please remember to get fasting lab work done prior to that visit

## 2023-12-21 NOTE — ASSESSMENT & PLAN NOTE
-Hemoglobin A1c was 6.8 this time  -I am increasing metformin to 1000 mg twice daily  -He is going to explore therapeutic options for injectable medications to address diabetes and weight loss.

## 2023-12-21 NOTE — PROGRESS NOTES
Subjective   Patient ID: Sebastian Carr is a 43 y.o. male who presents for Follow-up (6 mos).  HPI  He is here today for his routine 6-month checkup.  Unfortunately struggling a little bit with his diet and his weight has gone up.  We are reminded that in the past his insurance plan was covering a weight loss medication but then when his hemoglobin A1c improved they stopped covering it.  We talked about the challenges of weight loss and about the various methods.  We talked about behavioral modification and he states he does have a program through his work where he can sign up and get some help.  Everything managing weight watchers.  We also discussed the weight loss program through [facility].  Also discussed various medications including the GLP-1 inhibitors.  We also discussed weight loss surgery.  We discussed the pros and cons of those different modalities.  He is going to check with his insurance plan regarding medication coverage and he also thinks he might look into going into a program.  He will let us know.  We also conducted a full review of systems and for the most part he reports feeling okay with the exception of some increased fatigue.  He states he has been working longer hours though.  We also reviewed his most recent laboratory test results and his hemoglobin A1c did go up to 6.8 from 5.7 last time.  I decided to optimize his metformin by having him take it 1000 mg twice daily.  We also discussed the high triglycerides and how they typically improve with improved blood sugars.  We also discussed the gut health and I have recommended a probiotic .  Also discussed hemorrhoid issues and I have recommended fiber therapy to prevent constipation which could in turn exacerbate his hemorrhoid issues.  He states he is getting satisfactory relief with over-the-counter products . We will see him back at the very least in 6 months for follow-up or sooner if any problems.  Review of Systems    Constitutional:  Positive for fatigue.   Respiratory:  Negative for cough, shortness of breath and wheezing.    Cardiovascular:  Negative for chest pain, palpitations and leg swelling.   Gastrointestinal:  Negative for abdominal pain, blood in stool, diarrhea, nausea and vomiting.   Musculoskeletal:  Negative for arthralgias and back pain.     Objective   Physical Exam  Vitals and nursing note reviewed.   Constitutional:       General: He is not in acute distress.     Appearance: Normal appearance.   HENT:      Head: Normocephalic and atraumatic.   Eyes:      Conjunctiva/sclera: Conjunctivae normal.   Cardiovascular:      Rate and Rhythm: Normal rate and regular rhythm.      Heart sounds: Normal heart sounds.   Pulmonary:      Effort: No respiratory distress.      Breath sounds: No wheezing.   Abdominal:      Palpations: Abdomen is soft.      Tenderness: There is no abdominal tenderness. There is no guarding.   Musculoskeletal:         General: No swelling. Normal range of motion.   Skin:     General: Skin is warm and dry.   Neurological:      General: No focal deficit present.      Mental Status: He is alert and oriented to person, place, and time.   Psychiatric:         Behavior: Behavior normal.       Recent Results (from the past 672 hour(s))   Lipid Panel    Collection Time: 12/20/23 10:36 AM   Result Value Ref Range    Cholesterol 198 0 - 199 mg/dL    HDL-Cholesterol 45.0 mg/dL    Cholesterol/HDL Ratio 4.4     LDL Calculated 102 (H) <=99 mg/dL    VLDL 51 (H) 0 - 40 mg/dL    Triglycerides 257 (H) 0 - 149 mg/dL    Non HDL Cholesterol 153 (H) 0 - 149 mg/dL   Hemoglobin A1C    Collection Time: 12/20/23 10:36 AM   Result Value Ref Range    Hemoglobin A1C 6.8 (H) see below %    Estimated Average Glucose 148 Not Established mg/dL   Basic Metabolic Panel    Collection Time: 12/20/23 10:36 AM   Result Value Ref Range    Glucose 99 74 - 99 mg/dL    Sodium 137 136 - 145 mmol/L    Potassium 4.3 3.5 - 5.3 mmol/L     Chloride 101 98 - 107 mmol/L    Bicarbonate 28 21 - 32 mmol/L    Anion Gap 12 10 - 20 mmol/L    Urea Nitrogen 19 6 - 23 mg/dL    Creatinine 0.89 0.50 - 1.30 mg/dL    eGFR >90 >60 mL/min/1.73m*2    Calcium 9.1 8.6 - 10.3 mg/dL       Assessment/Plan   Problem List Items Addressed This Visit             ICD-10-CM    GERD (gastroesophageal reflux disease) K21.9     -Currently adequately controlled  -He will continue with omeprazole 40 mg daily         Relevant Medications    omeprazole (PriLOSEC) 40 mg DR capsule    Generalized anxiety disorder with panic attacks F41.1, F41.0     -He is doing well and he will continue with escitalopram 20 mg daily         Type 2 diabetes mellitus without complication, without long-term current use of insulin (CMS/[facility]) E11.9     -Hemoglobin A1c was 6.8 this time  -I am increasing metformin to 1000 mg twice daily  -He is going to explore therapeutic options for injectable medications to address diabetes and weight loss.         Relevant Medications    metFORMIN (Glucophage) 1,000 mg tablet    Other Relevant Orders    Basic Metabolic Panel    Hemoglobin A1C    Obstructive lung disease (CMS/[facility]) J44.9     -Stable         Relevant Medications    albuterol 90 mcg/actuation inhaler    HTN (hypertension) I10     -Current weight at currently controlled  -He will continue with lisinopril 10 mg daily         Relevant Medications    lisinopril 10 mg tablet    BMI 50.0-59.9, adult (CMS/[facility]) - Primary Z68.43     -We discussed weight loss and he will let us know what he finds out from his insurance plan          Other Visit Diagnoses         Codes    Mixed hyperlipidemia     E78.2    Relevant Orders    Lipid Panel               Marcelina Chavez, DO

## 2024-03-08 DIAGNOSIS — F41.1 GENERALIZED ANXIETY DISORDER WITH PANIC ATTACKS: ICD-10-CM

## 2024-03-08 DIAGNOSIS — F41.0 GENERALIZED ANXIETY DISORDER WITH PANIC ATTACKS: ICD-10-CM

## 2024-03-11 RX ORDER — ESCITALOPRAM OXALATE 20 MG/1
20 TABLET ORAL DAILY
Qty: 90 TABLET | Refills: 1 | Status: SHIPPED | OUTPATIENT
Start: 2024-03-11

## 2024-03-19 ENCOUNTER — OFFICE VISIT (OUTPATIENT)
Dept: PRIMARY CARE | Facility: CLINIC | Age: 44
End: 2024-03-19
Payer: COMMERCIAL

## 2024-03-19 VITALS
OXYGEN SATURATION: 97 % | WEIGHT: 315 LBS | HEIGHT: 70 IN | SYSTOLIC BLOOD PRESSURE: 132 MMHG | HEART RATE: 94 BPM | BODY MASS INDEX: 45.1 KG/M2 | DIASTOLIC BLOOD PRESSURE: 88 MMHG

## 2024-03-19 DIAGNOSIS — J44.9 OBSTRUCTIVE LUNG DISEASE (MULTI): ICD-10-CM

## 2024-03-19 DIAGNOSIS — E11.9 TYPE 2 DIABETES MELLITUS WITHOUT COMPLICATION, WITHOUT LONG-TERM CURRENT USE OF INSULIN (MULTI): ICD-10-CM

## 2024-03-19 DIAGNOSIS — B37.49 YEAST DERMATITIS OF PENIS: ICD-10-CM

## 2024-03-19 DIAGNOSIS — R30.0 DYSURIA: Primary | ICD-10-CM

## 2024-03-19 DIAGNOSIS — L91.8 SKIN TAG: ICD-10-CM

## 2024-03-19 PROCEDURE — 3075F SYST BP GE 130 - 139MM HG: CPT | Performed by: INTERNAL MEDICINE

## 2024-03-19 PROCEDURE — 87086 URINE CULTURE/COLONY COUNT: CPT

## 2024-03-19 PROCEDURE — 3008F BODY MASS INDEX DOCD: CPT | Performed by: INTERNAL MEDICINE

## 2024-03-19 PROCEDURE — 99214 OFFICE O/P EST MOD 30 MIN: CPT | Performed by: INTERNAL MEDICINE

## 2024-03-19 PROCEDURE — 3079F DIAST BP 80-89 MM HG: CPT | Performed by: INTERNAL MEDICINE

## 2024-03-19 PROCEDURE — 4010F ACE/ARB THERAPY RXD/TAKEN: CPT | Performed by: INTERNAL MEDICINE

## 2024-03-19 PROCEDURE — 87800 DETECT AGNT MULT DNA DIREC: CPT

## 2024-03-19 RX ORDER — FLUCONAZOLE 150 MG/1
150 TABLET ORAL ONCE
Qty: 1 TABLET | Refills: 0 | Status: SHIPPED | OUTPATIENT
Start: 2024-03-19 | End: 2024-03-19

## 2024-03-19 RX ORDER — NYSTATIN 100000 U/G
CREAM TOPICAL 2 TIMES DAILY
Qty: 30 G | Refills: 0 | Status: SHIPPED | OUTPATIENT
Start: 2024-03-19 | End: 2024-03-26

## 2024-03-19 RX ORDER — LANCETS
EACH MISCELLANEOUS
Qty: 100 EACH | Refills: 11 | Status: SHIPPED | OUTPATIENT
Start: 2024-03-19

## 2024-03-19 RX ORDER — SEMAGLUTIDE 0.25 MG/.5ML
0.25 INJECTION, SOLUTION SUBCUTANEOUS
Qty: 2 ML | Refills: 1 | Status: SHIPPED | OUTPATIENT
Start: 2024-03-19 | End: 2024-04-10

## 2024-03-19 RX ORDER — BENZOCAINE .13; .15; .5; 2 G/100G; G/100G; G/100G; G/100G
1 GEL ORAL DAILY
COMMUNITY

## 2024-03-19 ASSESSMENT — ENCOUNTER SYMPTOMS
PALPITATIONS: 0
ABDOMINAL PAIN: 0
DYSURIA: 1
NAUSEA: 1
BLOOD IN STOOL: 0
BACK PAIN: 0
FEVER: 0
COUGH: 0
ARTHRALGIAS: 0
VOMITING: 0
HEMATURIA: 0
DIARRHEA: 1
FREQUENCY: 1
SHORTNESS OF BREATH: 0

## 2024-03-19 NOTE — PROGRESS NOTES
Subjective   Patient ID: Sebastian Carr is a 44 y.o. male who presents for Follow-up (Patient reported burning and itching around his penile head. Would like to discuss his weight if able to at this appt. ).  HPI  He is here today for evaluation of some symptoms and we also ended up talking about his diabetes and his weight concerns.  He states that for the past week he has noticed what he thinks is a yeast dermatitis around his foreskin.  He states it is itchy and irritated.  We did conduct a full review of systems and he states he has not been sexually active for probably 8 months.  Based on today's evaluation I will be checking for possible infection and I am also prescribing Diflucan as well as a topical antiyeast cream.  We also discussed how high blood sugar could contribute to a yeast infection we even discussed possibly seeing urology if this becomes a recurrent phenomenon.  He states he would like to pursue some weight loss avenues and he states he was advised by his insurance plan that Wegovy would be covered.  We will place an order and see where it goes.  He also states he has some weight loss program options to his insurance and he is also thinking about weight watchers which I think is an excellent idea.  We also are providing refills on his testing supplies and I have asked that he check twice a day before breakfast and supper just to make sure his numbers are not going too high.  He also has a skin tag on his left abdominal/chest wall and it is totally benign so I provided reassurance today.  We will try to summarize everything in the problem based format.  Review of Systems   Constitutional:  Negative for fever.   Respiratory:  Negative for cough and shortness of breath.         Some coughing and wheezing last week   Cardiovascular:  Negative for chest pain, palpitations and leg swelling.   Gastrointestinal:  Positive for diarrhea and nausea. Negative for abdominal pain, blood in stool and vomiting.    Genitourinary:  Positive for dysuria, frequency and testicular pain. Negative for hematuria, penile discharge and urgency.   Musculoskeletal:  Negative for arthralgias and back pain.     Objective   Physical Exam  Vitals and nursing note reviewed.   Constitutional:       General: He is not in acute distress.     Appearance: Normal appearance.   HENT:      Head: Normocephalic and atraumatic.   Eyes:      Conjunctiva/sclera: Conjunctivae normal.   Cardiovascular:      Rate and Rhythm: Normal rate and regular rhythm.      Heart sounds: Normal heart sounds.   Pulmonary:      Effort: No respiratory distress.      Breath sounds: No wheezing.   Abdominal:      Palpations: Abdomen is soft.      Tenderness: There is no abdominal tenderness. There is no guarding.   Musculoskeletal:         General: No swelling. Normal range of motion.   Skin:     General: Skin is warm and dry.   Neurological:      General: No focal deficit present.      Mental Status: He is alert and oriented to person, place, and time.   Psychiatric:         Behavior: Behavior normal.       Assessment/Plan   Problem List Items Addressed This Visit             ICD-10-CM    Type 2 diabetes mellitus without complication, without long-term current use of insulin (CMS/[facility]) E11.9     -We are providing refills for lancets and he will at least temporarily check his sugars twice a day before meals to make sure they are not going too high  -Will also try to add Wegovy and see where it goes         Relevant Medications    semaglutide, weight loss, (Wegovy) 0.25 mg/0.5 mL pen injector    lancets misc    Obstructive lung disease (CMS/[facility]) J44.9     -Stable  -will continue current medications and continue to monitor         BMI 50.0-59.9, adult (CMS/[facility]) Z68.43     -We discussed joining weight watchers or possibly being referred to the weight loss clinic through [facility].  He also states there is a program offered through his workplace and he will explore  those options  -We will also try Wegovy         Relevant Medications    semaglutide, weight loss, (Wegovy) 0.25 mg/0.5 mL pen injector    Yeast dermatitis of penis B37.49     -I strongly suspect that he is suffering from a yeast dermatitis and I am giving him Diflucan 1 tablet x 1 and also cream to apply to the area twice a day until healed  -He understands that not being circumcised can be increased risk and also high blood glucose levels can also contribute         Relevant Medications    fluconazole (Diflucan) 150 mg tablet    nystatin (Mycostatin) cream    Dysuria - Primary R30.0    Relevant Orders    Urine Culture    C. trachomatis / N. gonorrhoeae, DNA probe    Skin tag L91.8          Marcelina Chavez, DO

## 2024-03-19 NOTE — ASSESSMENT & PLAN NOTE
-We discussed joining weight watchers or possibly being referred to the weight loss clinic through [facility].  He also states there is a program offered through his workplace and he will explore those options  -We will also try Wegovy

## 2024-03-19 NOTE — ASSESSMENT & PLAN NOTE
-We are providing refills for lancets and he will at least temporarily check his sugars twice a day before meals to make sure they are not going too high  -Will also try to add Wegovy and see where it goes

## 2024-03-19 NOTE — ASSESSMENT & PLAN NOTE
-I strongly suspect that he is suffering from a yeast dermatitis and I am giving him Diflucan 1 tablet x 1 and also cream to apply to the area twice a day until healed  -He understands that not being circumcised can be increased risk and also high blood glucose levels can also contribute

## 2024-03-19 NOTE — PATIENT INSTRUCTIONS
As we discussed I sent 3 prescriptions to your pharmacy today.  1 is for Wegovy and lets see if your insurance plan will pay for it  The other is for a tablet called Diflucan which you take by mouth x 1.  This usually will clear up any type of yeast infection but I also gave you an antifungal cream that you can apply directly to the involved areas twice a day.  Please let me know if your condition is worsening as opposed to getting better  We are also checking for urinary infection just to make sure we have covered all the bases we will contact you when those results come back  I will see you back as previously planned and you will be getting lab work done prior to that visit

## 2024-03-20 LAB
BACTERIA UR CULT: NO GROWTH
C TRACH RRNA SPEC QL NAA+PROBE: NEGATIVE
N GONORRHOEA DNA SPEC QL PROBE+SIG AMP: NEGATIVE

## 2024-06-13 DIAGNOSIS — E11.9 TYPE 2 DIABETES MELLITUS WITHOUT COMPLICATION, WITHOUT LONG-TERM CURRENT USE OF INSULIN (MULTI): ICD-10-CM

## 2024-06-13 RX ORDER — METFORMIN HYDROCHLORIDE 1000 MG/1
1000 TABLET ORAL
Qty: 180 TABLET | Refills: 1 | Status: SHIPPED | OUTPATIENT
Start: 2024-06-13

## 2024-06-15 ENCOUNTER — LAB (OUTPATIENT)
Dept: LAB | Facility: LAB | Age: 44
End: 2024-06-15
Payer: COMMERCIAL

## 2024-06-15 DIAGNOSIS — E78.2 MIXED HYPERLIPIDEMIA: ICD-10-CM

## 2024-06-15 DIAGNOSIS — E11.9 TYPE 2 DIABETES MELLITUS WITHOUT COMPLICATION, WITHOUT LONG-TERM CURRENT USE OF INSULIN (MULTI): ICD-10-CM

## 2024-06-15 LAB
ANION GAP SERPL CALC-SCNC: 14 MMOL/L (ref 10–20)
BUN SERPL-MCNC: 13 MG/DL (ref 6–23)
CALCIUM SERPL-MCNC: 9.1 MG/DL (ref 8.6–10.3)
CHLORIDE SERPL-SCNC: 97 MMOL/L (ref 98–107)
CHOLEST SERPL-MCNC: 185 MG/DL (ref 0–199)
CHOLESTEROL/HDL RATIO: 4.7
CO2 SERPL-SCNC: 27 MMOL/L (ref 21–32)
CREAT SERPL-MCNC: 0.88 MG/DL (ref 0.5–1.3)
EGFRCR SERPLBLD CKD-EPI 2021: >90 ML/MIN/1.73M*2
EST. AVERAGE GLUCOSE BLD GHB EST-MCNC: 321 MG/DL
GLUCOSE SERPL-MCNC: 243 MG/DL (ref 74–99)
HBA1C MFR BLD: 12.8 %
HDLC SERPL-MCNC: 39 MG/DL
LDLC SERPL CALC-MCNC: 99 MG/DL
NON HDL CHOLESTEROL: 146 MG/DL (ref 0–149)
POTASSIUM SERPL-SCNC: 4.1 MMOL/L (ref 3.5–5.3)
SODIUM SERPL-SCNC: 134 MMOL/L (ref 136–145)
TRIGL SERPL-MCNC: 233 MG/DL (ref 0–149)
VLDL: 47 MG/DL (ref 0–40)

## 2024-06-15 PROCEDURE — 80048 BASIC METABOLIC PNL TOTAL CA: CPT

## 2024-06-15 PROCEDURE — 80061 LIPID PANEL: CPT

## 2024-06-15 PROCEDURE — 83036 HEMOGLOBIN GLYCOSYLATED A1C: CPT

## 2024-06-15 PROCEDURE — 36415 COLL VENOUS BLD VENIPUNCTURE: CPT

## 2024-06-20 ENCOUNTER — APPOINTMENT (OUTPATIENT)
Dept: PRIMARY CARE | Facility: CLINIC | Age: 44
End: 2024-06-20
Payer: COMMERCIAL

## 2024-06-20 ENCOUNTER — TELEPHONE (OUTPATIENT)
Dept: PRIMARY CARE | Facility: CLINIC | Age: 44
End: 2024-06-20

## 2024-06-20 VITALS
BODY MASS INDEX: 49.62 KG/M2 | HEART RATE: 93 BPM | DIASTOLIC BLOOD PRESSURE: 76 MMHG | SYSTOLIC BLOOD PRESSURE: 138 MMHG | OXYGEN SATURATION: 96 % | WEIGHT: 315 LBS

## 2024-06-20 DIAGNOSIS — E11.9 TYPE 2 DIABETES MELLITUS WITHOUT COMPLICATION, WITHOUT LONG-TERM CURRENT USE OF INSULIN (MULTI): ICD-10-CM

## 2024-06-20 DIAGNOSIS — E66.01 CLASS 3 SEVERE OBESITY DUE TO EXCESS CALORIES WITH SERIOUS COMORBIDITY AND BODY MASS INDEX (BMI) OF 45.0 TO 49.9 IN ADULT (MULTI): Primary | ICD-10-CM

## 2024-06-20 DIAGNOSIS — F41.1 GENERALIZED ANXIETY DISORDER WITH PANIC ATTACKS: ICD-10-CM

## 2024-06-20 DIAGNOSIS — J44.9 OBSTRUCTIVE LUNG DISEASE (MULTI): ICD-10-CM

## 2024-06-20 DIAGNOSIS — E11.9 TYPE 2 DIABETES MELLITUS WITHOUT COMPLICATION, WITHOUT LONG-TERM CURRENT USE OF INSULIN (MULTI): Primary | ICD-10-CM

## 2024-06-20 DIAGNOSIS — F41.0 GENERALIZED ANXIETY DISORDER WITH PANIC ATTACKS: ICD-10-CM

## 2024-06-20 DIAGNOSIS — I10 PRIMARY HYPERTENSION: ICD-10-CM

## 2024-06-20 DIAGNOSIS — K21.9 GASTROESOPHAGEAL REFLUX DISEASE, UNSPECIFIED WHETHER ESOPHAGITIS PRESENT: ICD-10-CM

## 2024-06-20 PROBLEM — D86.9 SARCOIDOSIS: Status: RESOLVED | Noted: 2024-06-20 | Resolved: 2024-06-20

## 2024-06-20 PROBLEM — R91.8 ABNORMAL FINDINGS ON DIAGNOSTIC IMAGING OF LUNG: Status: RESOLVED | Noted: 2024-06-20 | Resolved: 2024-06-20

## 2024-06-20 PROBLEM — R30.0 DYSURIA: Status: RESOLVED | Noted: 2024-03-19 | Resolved: 2024-06-20

## 2024-06-20 PROCEDURE — 3048F LDL-C <100 MG/DL: CPT | Performed by: INTERNAL MEDICINE

## 2024-06-20 PROCEDURE — 3078F DIAST BP <80 MM HG: CPT | Performed by: INTERNAL MEDICINE

## 2024-06-20 PROCEDURE — 3046F HEMOGLOBIN A1C LEVEL >9.0%: CPT | Performed by: INTERNAL MEDICINE

## 2024-06-20 PROCEDURE — 3008F BODY MASS INDEX DOCD: CPT | Performed by: INTERNAL MEDICINE

## 2024-06-20 PROCEDURE — 3075F SYST BP GE 130 - 139MM HG: CPT | Performed by: INTERNAL MEDICINE

## 2024-06-20 PROCEDURE — 99214 OFFICE O/P EST MOD 30 MIN: CPT | Performed by: INTERNAL MEDICINE

## 2024-06-20 PROCEDURE — 4010F ACE/ARB THERAPY RXD/TAKEN: CPT | Performed by: INTERNAL MEDICINE

## 2024-06-20 RX ORDER — LISINOPRIL 10 MG/1
10 TABLET ORAL DAILY
Qty: 90 TABLET | Refills: 1 | Status: CANCELLED | OUTPATIENT
Start: 2024-06-20

## 2024-06-20 RX ORDER — BLOOD-GLUCOSE,RECEIVER,CONT
EACH MISCELLANEOUS
Qty: 1 EACH | Refills: 0 | Status: SHIPPED | OUTPATIENT
Start: 2024-06-20

## 2024-06-20 RX ORDER — BLOOD-GLUCOSE SENSOR
EACH MISCELLANEOUS
Qty: 4 EACH | Refills: 11 | Status: SHIPPED | OUTPATIENT
Start: 2024-06-20

## 2024-06-20 RX ORDER — ALBUTEROL SULFATE 90 UG/1
AEROSOL, METERED RESPIRATORY (INHALATION)
Qty: 8.5 G | Refills: 3 | Status: SHIPPED | OUTPATIENT
Start: 2024-06-20

## 2024-06-20 RX ORDER — GLIPIZIDE 5 MG/1
5 TABLET, FILM COATED, EXTENDED RELEASE ORAL DAILY
Qty: 30 TABLET | Refills: 5 | Status: SHIPPED | OUTPATIENT
Start: 2024-06-20 | End: 2025-06-20

## 2024-06-20 RX ORDER — OMEPRAZOLE 40 MG/1
40 CAPSULE, DELAYED RELEASE ORAL
Qty: 90 CAPSULE | Refills: 1 | Status: SHIPPED | OUTPATIENT
Start: 2024-06-20

## 2024-06-20 RX ORDER — LISINOPRIL 20 MG/1
20 TABLET ORAL DAILY
Qty: 90 TABLET | Refills: 1 | Status: SHIPPED | OUTPATIENT
Start: 2024-06-20 | End: 2025-07-25

## 2024-06-20 RX ORDER — METFORMIN HYDROCHLORIDE 1000 MG/1
1000 TABLET ORAL
Qty: 180 TABLET | Refills: 1 | Status: SHIPPED | OUTPATIENT
Start: 2024-06-20

## 2024-06-20 RX ORDER — ESCITALOPRAM OXALATE 20 MG/1
20 TABLET ORAL DAILY
Qty: 90 TABLET | Refills: 1 | Status: SHIPPED | OUTPATIENT
Start: 2024-06-20

## 2024-06-20 RX ORDER — FLASH GLUCOSE SCANNING READER
EACH MISCELLANEOUS
Qty: 1 EACH | Refills: 0 | Status: SHIPPED | OUTPATIENT
Start: 2024-06-20 | End: 2024-06-20 | Stop reason: WASHOUT

## 2024-06-20 ASSESSMENT — ENCOUNTER SYMPTOMS
COUGH: 0
ARTHRALGIAS: 0
FATIGUE: 1
SHORTNESS OF BREATH: 0
BACK PAIN: 0
NAUSEA: 0
BLOOD IN STOOL: 0
ABDOMINAL PAIN: 0
WHEEZING: 0
VOMITING: 0
DIARRHEA: 0
PALPITATIONS: 0

## 2024-06-20 NOTE — ASSESSMENT & PLAN NOTE
-Blood pressure was a bit generous when he arrived and I am increasing his lisinopril from 10 mg daily up to 20 mg daily

## 2024-06-20 NOTE — ASSESSMENT & PLAN NOTE
-Unfortunately his hemoglobin A1c went up very high at 12.8  -We are adding glipizide 10 mg daily  -I am prescribing Ozempic and he will let me know if it is not covered on his plan  -He will give me weekly updates on his blood glucose readings  -At the very least we will see him back in 3 months with another hemoglobin A1c and BMP

## 2024-06-20 NOTE — TELEPHONE ENCOUNTER
I sent in for the homa 3.  Thank you . If it is not covered but the Homa 2 is covered then I would want the homa 2.  Thank you

## 2024-06-20 NOTE — PROGRESS NOTES
"Subjective   Patient ID: Sebastian Carr is a 44 y.o. male who presents for Follow-up (6 month) and Results.  HPI  He is here today for his 6-month checkup.  We did conduct a review of systems and he does explain that a few months ago he was feeling extremely tired and in a \"fog \".  He states he was not feeling well at that time and his sugars were very high.  He states he has been working on it recently and his sugars are starting to come down somewhat.  Unfortunately his hemoglobin A1c is extremely high at 12.8.  I did explain to him that in the future we want to hear from him right away if sugars are running high and not wait until his follow-up appointment.  He expresses understanding.  We also noted that his blood pressure was generous when he arrived today and I am going to increase his lisinopril from 10 mg daily up to 20 mg daily.  We will continue to monitor.  We also will reattempt to give him a semaglutide and have chosen Ozempic.  He will call me right away if it is not covered with this plan.  I am also adding glipizide.  We also discussed giving me weekly checkups until we get things right and we can make adjustments in medications accordingly.  We did conduct a review of systems and we also reviewed his other laboratory test results.  His triglycerides were high which often happens when sugars are really high.  His sodium level was a little low which I am sure is secondary to his hyperglycemia.  We will recheck his numbers in approximately 3 months  Review of Systems   Constitutional:  Positive for fatigue.   Respiratory:  Negative for cough, shortness of breath and wheezing.    Cardiovascular:  Negative for chest pain, palpitations and leg swelling.   Gastrointestinal:  Negative for abdominal pain, blood in stool, diarrhea, nausea and vomiting.   Musculoskeletal:  Negative for arthralgias and back pain.     Objective   Physical Exam  Vitals and nursing note reviewed.   Constitutional:       General: He " is not in acute distress.     Appearance: Normal appearance.   HENT:      Head: Normocephalic and atraumatic.   Eyes:      Conjunctiva/sclera: Conjunctivae normal.   Cardiovascular:      Rate and Rhythm: Normal rate and regular rhythm.      Heart sounds: Normal heart sounds.   Pulmonary:      Effort: No respiratory distress.      Breath sounds: No wheezing.   Abdominal:      Palpations: Abdomen is soft.      Tenderness: There is no abdominal tenderness. There is no guarding.   Musculoskeletal:         General: No swelling. Normal range of motion.   Skin:     General: Skin is warm and dry.   Neurological:      General: No focal deficit present.      Mental Status: He is alert and oriented to person, place, and time.   Psychiatric:         Behavior: Behavior normal.       Recent Results (from the past 672 hour(s))   Basic Metabolic Panel    Collection Time: 06/15/24 10:55 AM   Result Value Ref Range    Glucose 243 (H) 74 - 99 mg/dL    Sodium 134 (L) 136 - 145 mmol/L    Potassium 4.1 3.5 - 5.3 mmol/L    Chloride 97 (L) 98 - 107 mmol/L    Bicarbonate 27 21 - 32 mmol/L    Anion Gap 14 10 - 20 mmol/L    Urea Nitrogen 13 6 - 23 mg/dL    Creatinine 0.88 0.50 - 1.30 mg/dL    eGFR >90 >60 mL/min/1.73m*2    Calcium 9.1 8.6 - 10.3 mg/dL   Hemoglobin A1C    Collection Time: 06/15/24 10:55 AM   Result Value Ref Range    Hemoglobin A1C 12.8 (H) see below %    Estimated Average Glucose 321 Not Established mg/dL   Lipid Panel    Collection Time: 06/15/24 10:55 AM   Result Value Ref Range    Cholesterol 185 0 - 199 mg/dL    HDL-Cholesterol 39.0 mg/dL    Cholesterol/HDL Ratio 4.7     LDL Calculated 99 <=99 mg/dL    VLDL 47 (H) 0 - 40 mg/dL    Triglycerides 233 (H) 0 - 149 mg/dL    Non HDL Cholesterol 146 0 - 149 mg/dL       Assessment/Plan   Problem List Items Addressed This Visit             ICD-10-CM    GERD (gastroesophageal reflux disease) K21.9    Relevant Medications    omeprazole (PriLOSEC) 40 mg DR capsule    Generalized  anxiety disorder with panic attacks F41.1, F41.0    Relevant Medications    escitalopram (Lexapro) 20 mg tablet    Type 2 diabetes mellitus without complication, without long-term current use of insulin (Multi) E11.9     -Unfortunately his hemoglobin A1c went up very high at 12.8  -We are adding glipizide 10 mg daily  -I am prescribing Ozempic and he will let me know if it is not covered on his plan  -He will give me weekly updates on his blood glucose readings  -At the very least we will see him back in 3 months with another hemoglobin A1c and BMP           Relevant Medications    metFORMIN (Glucophage) 1,000 mg tablet    glipiZIDE XL (Glucotrol XL) 5 mg 24 hr tablet    semaglutide 0.25 mg or 0.5 mg (2 mg/3 mL) pen injector (Start on 6/23/2024)    FreeStyle Homa 2 [place] misc    FreeStyle Homa 3 Sensor device    Other Relevant Orders    Hemoglobin A1C    Basic Metabolic Panel    Obstructive lung disease (Multi) J44.9    Relevant Medications    albuterol 90 mcg/actuation inhaler    Class 3 severe obesity due to excess calories with serious comorbidity and body mass index (BMI) of 45.0 to 49.9 in adult (Multi) - Primary E66.01, Z68.42    Relevant Medications    semaglutide 0.25 mg or 0.5 mg (2 mg/3 mL) pen injector (Start on 6/23/2024)    HTN (hypertension) I10     -Blood pressure was a bit generous when he arrived and I am increasing his lisinopril from 10 mg daily up to 20 mg daily         Relevant Medications    semaglutide 0.25 mg or 0.5 mg (2 mg/3 mL) pen injector (Start on 6/23/2024)    lisinopril 20 mg tablet          Marcelina Chavez DO

## 2024-06-20 NOTE — TELEPHONE ENCOUNTER
[facility] stating they got orders for the Freestyle Homa 3 sensor and Homa 2 reader; which should it be? Would need new orders sent in, thanks.

## 2024-06-20 NOTE — PATIENT INSTRUCTIONS
As we discussed we sent 3 different medicines to your pharmacy.  Instead of taking lisinopril 10 mg daily I have asked that you start taking 20 mg daily.  A new prescription will be for 20 mg but you can use up your 10 mg tablets now by taking 2 at the very same time.  If you can check her blood pressure at home please do so and give me an update.  Remember to sit relax for 10 to 20 minutes  I also sent a new medicine for your blood sugars which is a pill called glipizide and please start taking this once a day every morning  I also sent a prescription in for Ozempic starting dose and let me know right away if it is not covered by your plan  I would like to have weekly update on your blood glucose readings and I sent a continuous glucose monitoring system to your pharmacy.  Please let me know if this is not covered  At the very least we will see you back in 3 months and please remember to get fasting lab work done prior to that visit

## 2024-07-22 DIAGNOSIS — E11.9 TYPE 2 DIABETES MELLITUS WITHOUT COMPLICATION, WITHOUT LONG-TERM CURRENT USE OF INSULIN (MULTI): Primary | ICD-10-CM

## 2024-09-21 ENCOUNTER — LAB (OUTPATIENT)
Dept: LAB | Facility: LAB | Age: 44
End: 2024-09-21
Payer: COMMERCIAL

## 2024-09-21 DIAGNOSIS — E11.9 TYPE 2 DIABETES MELLITUS WITHOUT COMPLICATION, WITHOUT LONG-TERM CURRENT USE OF INSULIN (MULTI): ICD-10-CM

## 2024-09-21 LAB
ANION GAP SERPL CALC-SCNC: 12 MMOL/L (ref 10–20)
BUN SERPL-MCNC: 21 MG/DL (ref 6–23)
CALCIUM SERPL-MCNC: 9 MG/DL (ref 8.6–10.3)
CHLORIDE SERPL-SCNC: 102 MMOL/L (ref 98–107)
CO2 SERPL-SCNC: 26 MMOL/L (ref 21–32)
CREAT SERPL-MCNC: 0.76 MG/DL (ref 0.5–1.3)
EGFRCR SERPLBLD CKD-EPI 2021: >90 ML/MIN/1.73M*2
EST. AVERAGE GLUCOSE BLD GHB EST-MCNC: 151 MG/DL
GLUCOSE SERPL-MCNC: 97 MG/DL (ref 74–99)
HBA1C MFR BLD: 6.9 %
POTASSIUM SERPL-SCNC: 4.4 MMOL/L (ref 3.5–5.3)
SODIUM SERPL-SCNC: 136 MMOL/L (ref 136–145)

## 2024-09-21 PROCEDURE — 83036 HEMOGLOBIN GLYCOSYLATED A1C: CPT

## 2024-09-21 PROCEDURE — 80048 BASIC METABOLIC PNL TOTAL CA: CPT

## 2024-09-21 PROCEDURE — 36415 COLL VENOUS BLD VENIPUNCTURE: CPT

## 2024-09-23 ENCOUNTER — APPOINTMENT (OUTPATIENT)
Dept: PRIMARY CARE | Facility: CLINIC | Age: 44
End: 2024-09-23
Payer: COMMERCIAL

## 2024-09-23 VITALS
OXYGEN SATURATION: 97 % | BODY MASS INDEX: 45.1 KG/M2 | SYSTOLIC BLOOD PRESSURE: 130 MMHG | HEIGHT: 70 IN | HEART RATE: 97 BPM | DIASTOLIC BLOOD PRESSURE: 84 MMHG | WEIGHT: 315 LBS

## 2024-09-23 DIAGNOSIS — Z23 ENCOUNTER FOR IMMUNIZATION: ICD-10-CM

## 2024-09-23 DIAGNOSIS — K21.9 GASTROESOPHAGEAL REFLUX DISEASE, UNSPECIFIED WHETHER ESOPHAGITIS PRESENT: ICD-10-CM

## 2024-09-23 DIAGNOSIS — G47.33 OBSTRUCTIVE SLEEP APNEA, ADULT: ICD-10-CM

## 2024-09-23 DIAGNOSIS — I10 PRIMARY HYPERTENSION: ICD-10-CM

## 2024-09-23 DIAGNOSIS — T78.40XS ALLERGY, SEQUELA: ICD-10-CM

## 2024-09-23 DIAGNOSIS — F41.1 GENERALIZED ANXIETY DISORDER WITH PANIC ATTACKS: ICD-10-CM

## 2024-09-23 DIAGNOSIS — J44.9 OBSTRUCTIVE LUNG DISEASE (MULTI): ICD-10-CM

## 2024-09-23 DIAGNOSIS — F41.0 GENERALIZED ANXIETY DISORDER WITH PANIC ATTACKS: ICD-10-CM

## 2024-09-23 DIAGNOSIS — E78.2 MIXED HYPERLIPIDEMIA: Primary | ICD-10-CM

## 2024-09-23 DIAGNOSIS — E11.9 TYPE 2 DIABETES MELLITUS WITHOUT COMPLICATION, WITHOUT LONG-TERM CURRENT USE OF INSULIN (MULTI): ICD-10-CM

## 2024-09-23 PROBLEM — T78.40XA ALLERGIES: Status: ACTIVE | Noted: 2024-09-23

## 2024-09-23 PROCEDURE — 99214 OFFICE O/P EST MOD 30 MIN: CPT | Performed by: INTERNAL MEDICINE

## 2024-09-23 PROCEDURE — 4010F ACE/ARB THERAPY RXD/TAKEN: CPT | Performed by: INTERNAL MEDICINE

## 2024-09-23 PROCEDURE — 3075F SYST BP GE 130 - 139MM HG: CPT | Performed by: INTERNAL MEDICINE

## 2024-09-23 PROCEDURE — 3044F HG A1C LEVEL LT 7.0%: CPT | Performed by: INTERNAL MEDICINE

## 2024-09-23 PROCEDURE — 3079F DIAST BP 80-89 MM HG: CPT | Performed by: INTERNAL MEDICINE

## 2024-09-23 PROCEDURE — 90673 RIV3 VACCINE NO PRESERV IM: CPT | Performed by: INTERNAL MEDICINE

## 2024-09-23 PROCEDURE — 3048F LDL-C <100 MG/DL: CPT | Performed by: INTERNAL MEDICINE

## 2024-09-23 PROCEDURE — 3008F BODY MASS INDEX DOCD: CPT | Performed by: INTERNAL MEDICINE

## 2024-09-23 PROCEDURE — 90471 IMMUNIZATION ADMIN: CPT | Performed by: INTERNAL MEDICINE

## 2024-09-23 RX ORDER — FLUTICASONE PROPIONATE 50 MCG
2 SPRAY, SUSPENSION (ML) NASAL EVERY 12 HOURS
Qty: 16 G | Refills: 5 | Status: SHIPPED | OUTPATIENT
Start: 2024-09-23

## 2024-09-23 RX ORDER — METFORMIN HYDROCHLORIDE 1000 MG/1
1000 TABLET ORAL
Qty: 180 TABLET | Refills: 1 | Status: SHIPPED | OUTPATIENT
Start: 2024-09-23

## 2024-09-23 RX ORDER — ESCITALOPRAM OXALATE 20 MG/1
20 TABLET ORAL DAILY
Qty: 90 TABLET | Refills: 1 | Status: SHIPPED | OUTPATIENT
Start: 2024-09-23

## 2024-09-23 RX ORDER — ATORVASTATIN CALCIUM 10 MG/1
10 TABLET, FILM COATED ORAL DAILY
Qty: 30 TABLET | Refills: 5 | Status: SHIPPED | OUTPATIENT
Start: 2024-09-23 | End: 2025-10-28

## 2024-09-23 RX ORDER — OMEPRAZOLE 40 MG/1
40 CAPSULE, DELAYED RELEASE ORAL
Qty: 90 CAPSULE | Refills: 1 | Status: SHIPPED | OUTPATIENT
Start: 2024-09-23

## 2024-09-23 RX ORDER — LISINOPRIL 20 MG/1
20 TABLET ORAL DAILY
Qty: 90 TABLET | Refills: 1 | Status: SHIPPED | OUTPATIENT
Start: 2024-09-23 | End: 2025-10-28

## 2024-09-23 RX ORDER — ALBUTEROL SULFATE 90 UG/1
INHALANT RESPIRATORY (INHALATION)
Qty: 8.5 G | Refills: 5 | Status: SHIPPED | OUTPATIENT
Start: 2024-09-23

## 2024-09-23 ASSESSMENT — ENCOUNTER SYMPTOMS
CHEST TIGHTNESS: 0
COUGH: 0
NAUSEA: 0
ABDOMINAL PAIN: 0
DIARRHEA: 0
FATIGUE: 1
BACK PAIN: 0
BLOOD IN STOOL: 0
PALPITATIONS: 0
VOMITING: 0
SHORTNESS OF BREATH: 0
WHEEZING: 0
ARTHRALGIAS: 0

## 2024-09-23 ASSESSMENT — PATIENT HEALTH QUESTIONNAIRE - PHQ9
1. LITTLE INTEREST OR PLEASURE IN DOING THINGS: NOT AT ALL
2. FEELING DOWN, DEPRESSED OR HOPELESS: NOT AT ALL
SUM OF ALL RESPONSES TO PHQ9 QUESTIONS 1 AND 2: 0

## 2024-09-23 NOTE — ASSESSMENT & PLAN NOTE
-We are adding atorvastatin 10 mg daily and he will call if he does not feel like he is tolerating the medication  -We will check his cholesterol just prior to his next follow-up visit

## 2024-09-23 NOTE — ASSESSMENT & PLAN NOTE
-Blood pressure remains under good control and we will continue with his current antihypertensive regimen

## 2024-09-23 NOTE — ASSESSMENT & PLAN NOTE
-His lung condition appears to be stable and we are providing refills on medicines  -We getting the season's flu vaccine today

## 2024-09-23 NOTE — ASSESSMENT & PLAN NOTE
-He was having problems with low sugars and chasing them with excess carbohydrates  -He will stop the glipizide  -I would like to increase the dose of his Ozempic to 1 mg  -He will continue to monitor and give us updates if he has any problems

## 2024-09-23 NOTE — PROGRESS NOTES
Subjective   Patient ID: Sebastian Carr is a 44 y.o. male who presents for Follow-up (3 MO FUV).  HPI  He is here today for his general checkup.  He is looking well and for the most part reports feeling well with the exception of some fatigue.  We went over the results of his lab and I am extremely pleased.  He has managed to drop his hemoglobin A1c from 12.8 down to 6.9 which is phenomenal!  He states that he was running into some sugars dropping a little bit low so he started eating some extra carbohydrates and unfortunately his weight did go up a little bit.  We will stop the glipizide and he will continue to monitor closely and give me updates.  I decided to also increase the dose of his Ozempic.  I told him our goal is to keep his sugars under control to also help promote weight loss.  So far he is tolerating the medication well.  He states that recently he felt like he had contracted some type of cold or viral syndrome but he is feeling better today.  Has had some discomfort in his right jaw and he has an appointment to see his dentist in a couple of weeks.  He continues to use his CPAP device faithfully and he wonders if perhaps the mask is causing irritation.  I did tell him to see the dentist first and then if they cannot find a solution he can talk to his CPAP supplier.  We also discussed the diabetic guidelines for treating diabetes and we will place him on a low-dose of atorvastatin.  We also are giving him the season's flu vaccine today.  We have decided that we can safely see him back in 6 months for another checkup or sooner if any problems.  Review of Systems   Constitutional:  Positive for fatigue.   Respiratory:  Negative for cough, chest tightness, shortness of breath and wheezing.    Cardiovascular:  Negative for chest pain, palpitations and leg swelling.   Gastrointestinal:  Negative for abdominal pain, blood in stool, diarrhea, nausea and vomiting.   Musculoskeletal:  Negative for arthralgias and  back pain.     Objective   Physical Exam  Vitals and nursing note reviewed.   Constitutional:       General: He is not in acute distress.     Appearance: Normal appearance.   HENT:      Head: Normocephalic and atraumatic.   Eyes:      Conjunctiva/sclera: Conjunctivae normal.   Cardiovascular:      Rate and Rhythm: Normal rate and regular rhythm.      Heart sounds: Normal heart sounds.   Pulmonary:      Effort: No respiratory distress.      Breath sounds: No wheezing.   Abdominal:      Palpations: Abdomen is soft.      Tenderness: There is no abdominal tenderness. There is no guarding.   Musculoskeletal:         General: No swelling. Normal range of motion.   Skin:     General: Skin is warm and dry.   Neurological:      General: No focal deficit present.      Mental Status: He is alert and oriented to person, place, and time.   Psychiatric:         Behavior: Behavior normal.       Recent Results (from the past 672 hour(s))   Hemoglobin A1C    Collection Time: 09/21/24  8:23 AM   Result Value Ref Range    Hemoglobin A1C 6.9 (H) See comment %    Estimated Average Glucose 151 Not Established mg/dL   Basic Metabolic Panel    Collection Time: 09/21/24  8:23 AM   Result Value Ref Range    Glucose 97 74 - 99 mg/dL    Sodium 136 136 - 145 mmol/L    Potassium 4.4 3.5 - 5.3 mmol/L    Chloride 102 98 - 107 mmol/L    Bicarbonate 26 21 - 32 mmol/L    Anion Gap 12 10 - 20 mmol/L    Urea Nitrogen 21 6 - 23 mg/dL    Creatinine 0.76 0.50 - 1.30 mg/dL    eGFR >90 >60 mL/min/1.73m*2    Calcium 9.0 8.6 - 10.3 mg/dL       Assessment/Plan   Problem List Items Addressed This Visit             ICD-10-CM    GERD (gastroesophageal reflux disease) K21.9     -Acid reflux is controlled at this time we will continue with his current medical regimen         Relevant Medications    omeprazole (PriLOSEC) 40 mg DR capsule    Generalized anxiety disorder with panic attacks F41.1, F41.0     -Doing well at this time we will continue with his current  medical regimen         Relevant Medications    escitalopram (Lexapro) 20 mg tablet    Type 2 diabetes mellitus without complication, without long-term current use of insulin (Multi) E11.9     -He was having problems with low sugars and chasing them with excess carbohydrates  -He will stop the glipizide  -I would like to increase the dose of his Ozempic to 1 mg  -He will continue to monitor and give us updates if he has any problems         Relevant Medications    semaglutide (OZEMPIC) 1 mg/dose (4 mg/3 mL) pen injector    metFORMIN (Glucophage) 1,000 mg tablet    Other Relevant Orders    Hemoglobin A1C    Obstructive sleep apnea, adult G47.33     -He continues to use his CPAP device faithfully and has derived benefit from its use         Obstructive lung disease (Multi) J44.9     -His lung condition appears to be stable and we are providing refills on medicines  -We getting the season's flu vaccine today         Relevant Medications    albuterol 90 mcg/actuation inhaler    HTN (hypertension) I10     -Blood pressure remains under good control and we will continue with his current antihypertensive regimen         Relevant Medications    lisinopril 20 mg tablet    Other Relevant Orders    Comprehensive Metabolic Panel    Mixed hyperlipidemia - Primary E78.2     -We are adding atorvastatin 10 mg daily and he will call if he does not feel like he is tolerating the medication  -We will check his cholesterol just prior to his next follow-up visit         Relevant Medications    atorvastatin (Lipitor) 10 mg tablet    Other Relevant Orders    Lipid Panel    Allergies T78.40XA    Relevant Medications    fluticasone (Flonase) 50 mcg/actuation nasal spray   PT INSTRUCTIONS  As we discussed we made 3 changes in your medications today.  1 is that you will discontinue her glipizide.  We have added atorvastatin 10 mg daily and please let me know if you do not tolerate the drug.  If you get through your first month of the  medication is going well you could call for a 90-day prescription  We also are increasing your semaglutide i.e. Ozempic from 0.5 mg weekly now up to 1 mg weekly.  Please let me know if you are not doing well with this medical regimen and please understand that we can slowly increase the dose of Ozempic if necessary to help promote weight loss  We will see you back in 6 months for another checkup and please remember to get fasting lab work done prior to that visit       Marcelina Chavez, DO

## 2024-09-23 NOTE — PATIENT INSTRUCTIONS
As we discussed we made 3 changes in your medications today.  1 is that you will discontinue her glipizide.  We have added atorvastatin 10 mg daily and please let me know if you do not tolerate the drug.  If you get through your first month of the medication is going well you could call for a 90-day prescription  We also are increasing your semaglutide i.e. Ozempic from 0.5 mg weekly now up to 1 mg weekly.  Please let me know if you are not doing well with this medical regimen and please understand that we can slowly increase the dose of Ozempic if necessary to help promote weight loss  We will see you back in 6 months for another checkup and please remember to get fasting lab work done prior to that visit

## 2025-03-13 DIAGNOSIS — E78.2 MIXED HYPERLIPIDEMIA: ICD-10-CM

## 2025-03-23 LAB
ALBUMIN SERPL-MCNC: 4.5 G/DL (ref 3.6–5.1)
ALP SERPL-CCNC: 56 U/L (ref 36–130)
ALT SERPL-CCNC: 81 U/L (ref 9–46)
ANION GAP SERPL CALCULATED.4IONS-SCNC: 11 MMOL/L (CALC) (ref 7–17)
AST SERPL-CCNC: 53 U/L (ref 10–40)
BILIRUB SERPL-MCNC: 0.5 MG/DL (ref 0.2–1.2)
BUN SERPL-MCNC: 16 MG/DL (ref 7–25)
CALCIUM SERPL-MCNC: 9.1 MG/DL (ref 8.6–10.3)
CHLORIDE SERPL-SCNC: 104 MMOL/L (ref 98–110)
CHOLEST SERPL-MCNC: 154 MG/DL
CHOLEST/HDLC SERPL: 3.3 (CALC)
CO2 SERPL-SCNC: 25 MMOL/L (ref 20–32)
CREAT SERPL-MCNC: 0.75 MG/DL (ref 0.6–1.29)
EGFRCR SERPLBLD CKD-EPI 2021: 115 ML/MIN/1.73M2
EST. AVERAGE GLUCOSE BLD GHB EST-MCNC: 154 MG/DL
EST. AVERAGE GLUCOSE BLD GHB EST-SCNC: 8.5 MMOL/L
GLUCOSE SERPL-MCNC: 102 MG/DL (ref 65–99)
HBA1C MFR BLD: 7 % OF TOTAL HGB
HDLC SERPL-MCNC: 47 MG/DL
LDLC SERPL CALC-MCNC: 83 MG/DL (CALC)
NONHDLC SERPL-MCNC: 107 MG/DL (CALC)
POTASSIUM SERPL-SCNC: 4.2 MMOL/L (ref 3.5–5.3)
PROT SERPL-MCNC: 7 G/DL (ref 6.1–8.1)
SODIUM SERPL-SCNC: 140 MMOL/L (ref 135–146)
TRIGL SERPL-MCNC: 138 MG/DL

## 2025-03-24 ENCOUNTER — APPOINTMENT (OUTPATIENT)
Dept: LAB | Facility: HOSPITAL | Age: 45
End: 2025-03-24
Payer: COMMERCIAL

## 2025-03-24 ENCOUNTER — APPOINTMENT (OUTPATIENT)
Age: 45
End: 2025-03-24
Payer: COMMERCIAL

## 2025-03-24 VITALS
HEART RATE: 95 BPM | SYSTOLIC BLOOD PRESSURE: 134 MMHG | WEIGHT: 315 LBS | DIASTOLIC BLOOD PRESSURE: 86 MMHG | OXYGEN SATURATION: 98 % | HEIGHT: 70 IN | BODY MASS INDEX: 45.1 KG/M2

## 2025-03-24 DIAGNOSIS — E78.2 MIXED HYPERLIPIDEMIA: ICD-10-CM

## 2025-03-24 DIAGNOSIS — R74.8 ELEVATED LIVER ENZYMES: Primary | ICD-10-CM

## 2025-03-24 DIAGNOSIS — J44.9 OBSTRUCTIVE LUNG DISEASE (MULTI): ICD-10-CM

## 2025-03-24 DIAGNOSIS — K76.0 NAFLD (NONALCOHOLIC FATTY LIVER DISEASE): ICD-10-CM

## 2025-03-24 DIAGNOSIS — K21.9 GASTROESOPHAGEAL REFLUX DISEASE, UNSPECIFIED WHETHER ESOPHAGITIS PRESENT: ICD-10-CM

## 2025-03-24 DIAGNOSIS — F41.1 GENERALIZED ANXIETY DISORDER WITH PANIC ATTACKS: ICD-10-CM

## 2025-03-24 DIAGNOSIS — E11.9 TYPE 2 DIABETES MELLITUS WITHOUT COMPLICATION, WITHOUT LONG-TERM CURRENT USE OF INSULIN: ICD-10-CM

## 2025-03-24 DIAGNOSIS — I10 PRIMARY HYPERTENSION: ICD-10-CM

## 2025-03-24 DIAGNOSIS — F41.0 GENERALIZED ANXIETY DISORDER WITH PANIC ATTACKS: ICD-10-CM

## 2025-03-24 DIAGNOSIS — T78.40XS ALLERGY, SEQUELA: ICD-10-CM

## 2025-03-24 PROCEDURE — 86804 HEP C AB TEST CONFIRM: CPT

## 2025-03-24 PROCEDURE — 3008F BODY MASS INDEX DOCD: CPT | Performed by: INTERNAL MEDICINE

## 2025-03-24 PROCEDURE — 1036F TOBACCO NON-USER: CPT | Performed by: INTERNAL MEDICINE

## 2025-03-24 PROCEDURE — 3079F DIAST BP 80-89 MM HG: CPT | Performed by: INTERNAL MEDICINE

## 2025-03-24 PROCEDURE — 99214 OFFICE O/P EST MOD 30 MIN: CPT | Performed by: INTERNAL MEDICINE

## 2025-03-24 PROCEDURE — 4010F ACE/ARB THERAPY RXD/TAKEN: CPT | Performed by: INTERNAL MEDICINE

## 2025-03-24 PROCEDURE — 3075F SYST BP GE 130 - 139MM HG: CPT | Performed by: INTERNAL MEDICINE

## 2025-03-24 PROCEDURE — 87340 HEPATITIS B SURFACE AG IA: CPT

## 2025-03-24 RX ORDER — SEMAGLUTIDE 2.68 MG/ML
2 INJECTION, SOLUTION SUBCUTANEOUS
Qty: 12 ML | Refills: 5 | Status: SHIPPED | OUTPATIENT
Start: 2025-03-24

## 2025-03-24 RX ORDER — ALBUTEROL SULFATE 90 UG/1
INHALANT RESPIRATORY (INHALATION)
Qty: 8.5 G | Refills: 5 | Status: SHIPPED | OUTPATIENT
Start: 2025-03-24

## 2025-03-24 RX ORDER — ATORVASTATIN CALCIUM 10 MG/1
10 TABLET, FILM COATED ORAL DAILY
Qty: 30 TABLET | Refills: 5 | Status: CANCELLED | OUTPATIENT
Start: 2025-03-24 | End: 2026-04-28

## 2025-03-24 RX ORDER — BLOOD-GLUCOSE SENSOR
1 EACH MISCELLANEOUS
Qty: 2 EACH | Refills: 11 | Status: SHIPPED | OUTPATIENT
Start: 2025-03-24

## 2025-03-24 RX ORDER — METFORMIN HYDROCHLORIDE 1000 MG/1
1000 TABLET ORAL
Qty: 180 TABLET | Refills: 1 | Status: SHIPPED | OUTPATIENT
Start: 2025-03-24

## 2025-03-24 RX ORDER — LISINOPRIL 20 MG/1
20 TABLET ORAL DAILY
Qty: 100 TABLET | Refills: 1 | Status: SHIPPED | OUTPATIENT
Start: 2025-03-24 | End: 2026-04-28

## 2025-03-24 RX ORDER — ATORVASTATIN CALCIUM 10 MG/1
10 TABLET, FILM COATED ORAL DAILY
Qty: 30 TABLET | Refills: 5 | OUTPATIENT
Start: 2025-03-24

## 2025-03-24 RX ORDER — FLUTICASONE PROPIONATE 50 MCG
2 SPRAY, SUSPENSION (ML) NASAL EVERY 12 HOURS
Qty: 16 G | Refills: 5 | Status: SHIPPED | OUTPATIENT
Start: 2025-03-24

## 2025-03-24 RX ORDER — OMEPRAZOLE 40 MG/1
40 CAPSULE, DELAYED RELEASE ORAL
Qty: 90 CAPSULE | Refills: 1 | Status: SHIPPED | OUTPATIENT
Start: 2025-03-24

## 2025-03-24 RX ORDER — ESCITALOPRAM OXALATE 20 MG/1
20 TABLET ORAL DAILY
Qty: 100 TABLET | Refills: 1 | Status: SHIPPED | OUTPATIENT
Start: 2025-03-24

## 2025-03-24 ASSESSMENT — ENCOUNTER SYMPTOMS
CHEST TIGHTNESS: 0
FATIGUE: 1
NAUSEA: 0
ARTHRALGIAS: 0
BLOOD IN STOOL: 0
DIARRHEA: 0
VOMITING: 0
WHEEZING: 0
PALPITATIONS: 0
ABDOMINAL PAIN: 0
SHORTNESS OF BREATH: 0

## 2025-03-24 NOTE — ASSESSMENT & PLAN NOTE
-His cholesterol profile was excellent  -Unfortunately we feel the need to discontinue the atorvastatin for 2 months to see if his liver enzymes normalize

## 2025-03-24 NOTE — ASSESSMENT & PLAN NOTE
-His hemoglobin A1c was satisfactory at 7.0  -He has agreed to get a urine microalbumin this morning and we will contact him with results  -He does go to the eye doctor regularly for exams  -We talked about doing daily foot checks for any problems

## 2025-03-24 NOTE — PROGRESS NOTES
Subjective   Patient ID: Sebastian Carr is a 45 y.o. male who presents for Follow-up (6 MO CK).  HPI  He is here today for his routine 6-month checkup.  He does complain of feeling tired.  Otherwise we did conduct a full review of systems and overall he has been doing well.  He has had some minor low back pain after lifting an item at work but he states it is gradually resolving.  We also reviewed his laboratory test results and his hemoglobin A1c came back quite satisfactory at 7.0.  His kidney function looked good but we talked about doing a urine microalbumin study and he has agreed to get that on the way out today.  I will contact him with results.  We also remind him to get in at least once a year for his diabetic eye examination and to do daily foot checks.  We also reviewed his cholesterol which came back fantastic.  We are reminded that we started atorvastatin 6 months ago.  Unfortunately his liver enzymes went up as well.  He denies any alcohol use.  He understands that the atorvastatin could be responsible for the elevated liver enzymes.  We talked about stopping the atorvastatin for 2 months and he will go back for another liver profile.  I have agreed to contact him with results.  We also discussed possibly investigating for other causes of elevated liver enzymes with additional lab work.  He has expressed interest in getting those test done and again I will contact him with results.  He also understands that weight loss would be greatly beneficial for his liver.  We are going to increase the dose of his Ozempic to 2 mg and he will call if is not successful in helping with weight loss.  I will summarize everything in a problem based format.  Review of Systems   Constitutional:  Positive for fatigue.   Respiratory:  Negative for chest tightness, shortness of breath and wheezing.         Occ cough   Cardiovascular:  Negative for chest pain, palpitations and leg swelling.   Gastrointestinal:  Negative for  abdominal pain, blood in stool, diarrhea, nausea and vomiting.   Musculoskeletal:  Negative for arthralgias.        Complaint of some mid to low back pain after lifting items at work.  He states he feels like it is improving and resolving at this time     Objective   Physical Exam  Vitals and nursing note reviewed.   Constitutional:       General: He is not in acute distress.     Appearance: Normal appearance.   HENT:      Head: Normocephalic and atraumatic.   Eyes:      Conjunctiva/sclera: Conjunctivae normal.   Cardiovascular:      Rate and Rhythm: Normal rate and regular rhythm.      Heart sounds: Normal heart sounds.   Pulmonary:      Effort: No respiratory distress.      Breath sounds: No wheezing.   Abdominal:      Palpations: Abdomen is soft.      Tenderness: There is no abdominal tenderness. There is no guarding.   Musculoskeletal:         General: No swelling. Normal range of motion.   Skin:     General: Skin is warm and dry.   Neurological:      General: No focal deficit present.      Mental Status: He is alert and oriented to person, place, and time.   Psychiatric:         Behavior: Behavior normal.       Recent Results (from the past 5 weeks)   Lipid Panel    Collection Time: 03/22/25  8:00 AM   Result Value Ref Range    CHOLESTEROL, TOTAL 154 <200 mg/dL    HDL CHOLESTEROL 47 > OR = 40 mg/dL    TRIGLYCERIDES 138 <150 mg/dL    LDL-CHOLESTEROL 83 mg/dL (calc)    CHOL/HDLC RATIO 3.3 <5.0 (calc)    NON HDL CHOLESTEROL 107 <130 mg/dL (calc)   Comprehensive Metabolic Panel    Collection Time: 03/22/25  8:00 AM   Result Value Ref Range    GLUCOSE 102 (H) 65 - 99 mg/dL    UREA NITROGEN (BUN) 16 7 - 25 mg/dL    CREATININE 0.75 0.60 - 1.29 mg/dL    EGFR 115 > OR = 60 mL/min/1.73m2    SODIUM 140 135 - 146 mmol/L    POTASSIUM 4.2 3.5 - 5.3 mmol/L    CHLORIDE 104 98 - 110 mmol/L    CARBON DIOXIDE 25 20 - 32 mmol/L    ELECTROLYTE BALANCE 11 7 - 17 mmol/L (calc)    CALCIUM 9.1 8.6 - 10.3 mg/dL    PROTEIN, TOTAL 7.0 6.1  - 8.1 g/dL    ALBUMIN 4.5 3.6 - 5.1 g/dL    BILIRUBIN, TOTAL 0.5 0.2 - 1.2 mg/dL    ALKALINE PHOSPHATASE 56 36 - 130 U/L    AST 53 (H) 10 - 40 U/L    ALT 81 (H) 9 - 46 U/L   Hemoglobin A1C    Collection Time: 03/22/25  8:00 AM   Result Value Ref Range    HEMOGLOBIN A1c 7.0 (H) <5.7 % of total Hgb    eAG (mg/dL) 154 mg/dL    eAG (mmol/L) 8.5 mmol/L       Assessment/Plan   Problem List Items Addressed This Visit             ICD-10-CM    GERD (gastroesophageal reflux disease) K21.9     Well-controlled at this time we will continue to monitor-         Relevant Medications    omeprazole (PriLOSEC) 40 mg DR capsule    Generalized anxiety disorder with panic attacks F41.1, F41.0     -Doing well at this time on medication and we will continue to monitor         Relevant Medications    escitalopram (Lexapro) 20 mg tablet    Type 2 diabetes mellitus without complication, without long-term current use of insulin (Multi) E11.9     -His hemoglobin A1c was satisfactory at 7.0  -He has agreed to get a urine microalbumin this morning and we will contact him with results  -He does go to the eye doctor regularly for exams  -We talked about doing daily foot checks for any problems         Relevant Medications    metFORMIN (Glucophage) 1,000 mg tablet    semaglutide (Ozempic) 2 mg/dose (8 mg/3 mL) pen injector    blood-glucose sensor (FreeStyle Homa 3 Plus Sensor) device    Other Relevant Orders    Albumin-Creatinine Ratio, Urine Random    Comprehensive Metabolic Panel    Hemoglobin A1C    Obstructive lung disease (Multi) J44.9     -Stable and we will continue to monitor         Relevant Medications    albuterol 90 mcg/actuation inhaler    HTN (hypertension) I10     -His blood pressure is adequately controlled and we will continue to monitor for now         Relevant Medications    lisinopril 20 mg tablet    Mixed hyperlipidemia E78.2     -His cholesterol profile was excellent  -Unfortunately we feel the need to discontinue the  atorvastatin for 2 months to see if his liver enzymes normalize         Relevant Orders    Hepatic Function Panel    Allergies T78.40XA    Relevant Medications    fluticasone (Flonase) 50 mcg/actuation nasal spray   Patient instructions  As we discussed we will have you stop the lab today before leaving to have your blood drawn.  We are investigating the elevated liver enzymes.  Once all the results come back we will contact you.  Some labs might take longer than others.  Please also remember that we want you to temporarily at least stop your Lipitor for 2 months.  At the end of that time you will go back for liver enzyme checkup and I have agreed to contact you with results  As you know we are increasing the dose of your Ozempic to 2 mg and hopefully this will help accelerate healthy gradual weight loss.  Please call if you are having any problems with this medicine  Please try to quit using your vaping products  I am glad that you are going for regular eye exams and check your feet every day for any problems.  If everything goes well we will see you back in 6 months for another checkup       Marcelina Chavez, DO

## 2025-03-24 NOTE — PATIENT INSTRUCTIONS
Patient instructions  As we discussed we will have you stop the lab today before leaving to have your blood drawn.  We are investigating the elevated liver enzymes.  Once all the results come back we will contact you.  Some labs might take longer than others.  Please also remember that we want you to temporarily at least stop your Lipitor for 2 months.  At the end of that time you will go back for liver enzyme checkup and I have agreed to contact you with results  As you know we are increasing the dose of your Ozempic to 2 mg and hopefully this will help accelerate healthy gradual weight loss.  Please call if you are having any problems with this medicine  Please try to quit using your vaping products  I am glad that you are going for regular eye exams and check your feet every day for any problems.  If everything goes well we will see you back in 6 months for another checkup

## 2025-03-25 LAB
ALBUMIN SERPL-MCNC: 4.8 G/DL (ref 3.6–5.1)
ALBUMIN/CREAT UR: 3 MG/G CREAT
ALBUMIN/GLOB SERPL: 1.7 (CALC) (ref 1–2.5)
ALP SERPL-CCNC: 57 U/L (ref 36–130)
ALT SERPL-CCNC: 77 U/L (ref 9–46)
ANA SER QL IF: NEGATIVE
AST SERPL-CCNC: 40 U/L (ref 10–40)
BILIRUB DIRECT SERPL-MCNC: 0.1 MG/DL
BILIRUB INDIRECT SERPL-MCNC: 0.3 MG/DL (CALC) (ref 0.2–1.2)
BILIRUB SERPL-MCNC: 0.4 MG/DL (ref 0.2–1.2)
CERULOPLASMIN SERPL-MCNC: 22 MG/DL (ref 14–30)
CREAT UR-MCNC: 214 MG/DL (ref 20–320)
ERYTHROCYTE [DISTWIDTH] IN BLOOD BY AUTOMATED COUNT: 13.3 % (ref 11–15)
FERRITIN SERPL-MCNC: 45 NG/ML (ref 38–380)
GLOBULIN SER CALC-MCNC: 2.9 G/DL (CALC) (ref 1.9–3.7)
HAV AB SER QL IA: REACTIVE
HBV CORE AB SERPL QL IA: NORMAL
HBV SURFACE AB SERPL IA-ACNC: >1000 MIU/ML
HCT VFR BLD AUTO: 45.1 % (ref 38.5–50)
HGB BLD-MCNC: 14.8 G/DL (ref 13.2–17.1)
INR PPP: 1
IRON SATN MFR SERPL: 15 % (CALC) (ref 20–48)
IRON SERPL-MCNC: 51 MCG/DL (ref 50–180)
MCH RBC QN AUTO: 28.7 PG (ref 27–33)
MCHC RBC AUTO-ENTMCNC: 32.8 G/DL (ref 32–36)
MCV RBC AUTO: 87.6 FL (ref 80–100)
MICROALBUMIN UR-MCNC: 0.7 MG/DL
PLATELET # BLD AUTO: 287 THOUSAND/UL (ref 140–400)
PMV BLD REES-ECKER: 8.7 FL (ref 7.5–12.5)
PROT SERPL-MCNC: 7.7 G/DL (ref 6.1–8.1)
PROTHROMBIN TIME: 10.8 SEC (ref 9–11.5)
RBC # BLD AUTO: 5.15 MILLION/UL (ref 4.2–5.8)
TIBC SERPL-MCNC: 345 MCG/DL (CALC) (ref 250–425)
WBC # BLD AUTO: 7.8 THOUSAND/UL (ref 3.8–10.8)

## 2025-03-26 ENCOUNTER — HOSPITAL ENCOUNTER (OUTPATIENT)
Dept: RADIOLOGY | Facility: HOSPITAL | Age: 45
Discharge: HOME | End: 2025-03-26
Payer: COMMERCIAL

## 2025-03-26 DIAGNOSIS — R74.8 ELEVATED LIVER ENZYMES: ICD-10-CM

## 2025-03-26 LAB
HBV SURFACE AG SER DONR QL IA: NEGATIVE
HCV AB SER DONR QL IA: NEGATIVE

## 2025-03-26 PROCEDURE — 76705 ECHO EXAM OF ABDOMEN: CPT

## 2025-03-26 PROCEDURE — 76705 ECHO EXAM OF ABDOMEN: CPT | Performed by: RADIOLOGY

## 2025-05-23 DIAGNOSIS — E78.2 MIXED HYPERLIPIDEMIA: ICD-10-CM

## 2025-06-13 DIAGNOSIS — E11.9 TYPE 2 DIABETES MELLITUS WITHOUT COMPLICATION, WITHOUT LONG-TERM CURRENT USE OF INSULIN: ICD-10-CM

## 2025-06-13 DIAGNOSIS — I10 PRIMARY HYPERTENSION: ICD-10-CM

## 2025-06-16 RX ORDER — METFORMIN HYDROCHLORIDE 1000 MG/1
1000 TABLET ORAL
Qty: 200 TABLET | Refills: 0 | Status: SHIPPED | OUTPATIENT
Start: 2025-06-16

## 2025-06-16 RX ORDER — LISINOPRIL 20 MG/1
20 TABLET ORAL DAILY
Qty: 100 TABLET | Refills: 0 | Status: SHIPPED | OUTPATIENT
Start: 2025-06-16

## 2025-07-10 LAB
ALBUMIN SERPL-MCNC: 4.6 G/DL (ref 3.6–5.1)
ALBUMIN/GLOB SERPL: 1.7 (CALC) (ref 1–2.5)
ALP SERPL-CCNC: 67 U/L (ref 36–130)
ALT SERPL-CCNC: 105 U/L (ref 9–46)
AST SERPL-CCNC: 59 U/L (ref 10–40)
BILIRUB DIRECT SERPL-MCNC: 0.1 MG/DL
BILIRUB INDIRECT SERPL-MCNC: 0.3 MG/DL (CALC) (ref 0.2–1.2)
BILIRUB SERPL-MCNC: 0.4 MG/DL (ref 0.2–1.2)
GLOBULIN SER CALC-MCNC: 2.7 G/DL (CALC) (ref 1.9–3.7)
PROT SERPL-MCNC: 7.3 G/DL (ref 6.1–8.1)

## 2025-09-29 ENCOUNTER — APPOINTMENT (OUTPATIENT)
Age: 45
End: 2025-09-29
Payer: COMMERCIAL